# Patient Record
Sex: MALE | Race: OTHER | NOT HISPANIC OR LATINO
[De-identification: names, ages, dates, MRNs, and addresses within clinical notes are randomized per-mention and may not be internally consistent; named-entity substitution may affect disease eponyms.]

---

## 2021-05-21 DIAGNOSIS — Z01.818 ENCOUNTER FOR OTHER PREPROCEDURAL EXAMINATION: ICD-10-CM

## 2021-05-21 PROBLEM — Z00.00 ENCOUNTER FOR PREVENTIVE HEALTH EXAMINATION: Status: ACTIVE | Noted: 2021-05-21

## 2021-05-22 ENCOUNTER — APPOINTMENT (OUTPATIENT)
Dept: DISASTER EMERGENCY | Facility: CLINIC | Age: 51
End: 2021-05-22

## 2021-05-23 LAB — SARS-COV-2 N GENE NPH QL NAA+PROBE: NOT DETECTED

## 2021-05-24 RX ORDER — CHLORHEXIDINE GLUCONATE 213 G/1000ML
1 SOLUTION TOPICAL ONCE
Refills: 0 | Status: DISCONTINUED | OUTPATIENT
Start: 2021-05-25 | End: 2021-05-26

## 2021-05-24 NOTE — H&P PST ADULT - ASSESSMENT
58 y/o M with PMH CAD with c/o WOODARD. Recent NST + for inferolateral ischemia and he presents for [facility] for further evaluation of his coronary anatomy    NPO for procedure  Consent to be obtained by MD prior to procedure 56 y/o M with PMH CAD with c/o WOODARD. Recent NST + for inferolateral ischemia and he presents for [facility] for further evaluation of his coronary anatomy  Abn CARRIE/PVR also for peripheral angiogram    NPO for procedure  Consent to be obtained by MD prior to procedure  Procedure d/w patient, risks and benefits explained, questions answered

## 2021-05-24 NOTE — H&P PST ADULT - HISTORY OF PRESENT ILLNESS
49 y/o M with PMH HTN, HLD, DM, CAD S/P PCI to mid and prox LAD, mid and prox LCx, mid, proximal and distal RCA. He has had WOODARD and recent nuclear stress test which demonstrated inferoseptal ischemia. He presents for left heart catheterization with Dr Mckee for further evaluation of his coronary anatomy.      ASA  Mallampati  GFR  BRA  Covid negative 5/23/21    Symptoms:        Angina (Class):        Ischemic Symptoms: WOODARD    Heart Failure:        Systolic/Diastolic/Combined: NA       NYHA Class (within 2 weeks): NA    Assessment of LVEF (Must be within 6 months):       EF: 55%       Assessed by: NST       Date: 4/1/21    Prior Cardiac Interventions (LHC, stents, CABG):       PCI's (Date, Stents, Vessels): 4/2/20 JOE to mid and prox LAD, mid and prox LCx, mid, proximal and distal RCA       CABG (Date, Grafts): NA    Noninvasive Testing:   Stress Test: Date: 4/1/21       Protocol: Wallace       Duration of Exercise: 7 min       Symptoms: WOODARD       EKG Changes: 2.5 mm depression inferolateral leads       DTS: -5.5       Myocardial Imaging: small to moderate sized, mild severity, prox to mid inferoseptal, partially reversible defect suggestive of ischemia       Risk Assessment (Low, Medium, High): Medium    Echo (Date, Findings): 3/29/21 normal LV function EF 60-65%, tr MR    Antianginal Therapies:        Beta Blockers:         Calcium Channel Blockers:        Long Acting Nitrates:        Ranexa:     Associated Risk Factors:        Cerebrovascular Disease: N/A       Chronic Lung Disease: N/A       Peripheral Arterial Disease: N/A       Chronic Kidney Disease (if yes, what is GFR): N/A       Uncontrolled Diabetes (if yes, what is HgbA1C or FBS): N/A       Poorly Controlled Hypertension (if yes, what is SBP): N/A       Morbid Obesity (if yes, what is BMI): N/A       History of Recent Ventricular Arrhythmia: N/A       Inability to Ambulate Safely: N/A       Need for Therapeutic Anticoagulation: N/A       Antiplatelet or Contrast Allergy: N/A 51 y/o M, former smoker .5PPD x10 years quit 20 years ago) with PMH HTN, HLD, DM, CAD S/P PCI to mid and prox LAD, mid and prox LCx, mid, proximal and distal RCA. He has had WOODARD (CCSC III) and recent nuclear stress test which demonstrated inferoseptal ischemia.   He presents for left heart catheterization with Dr Mckee for further evaluation of his coronary anatomy.  OF note, he had an CARRIE/PVR in office and will be having a peripheral angiogram as well.     Assessment of LVEF (Must be within 6 months):       EF: 55%       Assessed by: NST       Date: 4/1/21    Prior Cardiac Interventions:  PCI's (Date, Stents, Vessels): 4/2/20 JOE to mid and prox LAD, mid and prox LCx, mid, proximal and distal RCA    Noninvasive Testing:   Stress Test: Date: 4/1/21       Protocol: Wallace       Duration of Exercise: 7 min       Symptoms: WOODARD       EKG Changes: 2.5 mm depression inferolateral leads       DTS: -5.5       Myocardial Imaging: small to moderate sized, mild severity, prox to mid inferoseptal, partially reversible defect suggestive of ischemia       Risk Assessment (Low, Medium, High): Medium    Echo (Date, Findings): 3/29/21 normal LV function EF 60-65%, tr MR

## 2021-05-24 NOTE — H&P PST ADULT - NSICDXPASTMEDICALHX_GEN_ALL_CORE_FT
PAST MEDICAL HISTORY:  CAD (coronary atherosclerotic disease)     Claudication     DM (diabetes mellitus)     HLD (hyperlipidemia)     HTN (hypertension)

## 2021-05-24 NOTE — H&P PST ADULT - REASON FOR ADMISSION
Left heart catheterization Left heart catheterization d/t inferoseptal ischemia on NST  Peripheral Angiography due to abn CARRIE/PVR

## 2021-05-24 NOTE — H&P PST ADULT - RS GEN PE MLT RESP DETAILS PC
normal/airway patent/breath sounds equal/good air movement/respirations non-labored/clear to auscultation bilaterally/no chest wall tenderness/no rales/no rhonchi

## 2021-05-24 NOTE — H&P PST ADULT - NSICDXFAMILYHX_GEN_ALL_CORE_FT
FAMILY HISTORY:  Father  Still living? Yes, Estimated age: Age Unknown  FH: CAD (coronary artery disease), Age at diagnosis: Age Unknown    Sibling  Still living? Yes, Estimated age: Age Unknown  FH: CAD (coronary artery disease), Age at diagnosis: Age Unknown

## 2021-05-25 ENCOUNTER — INPATIENT (INPATIENT)
Facility: HOSPITAL | Age: 51
LOS: 0 days | Discharge: HOME | DRG: 247 | End: 2021-05-26
Attending: INTERNAL MEDICINE | Admitting: INTERNAL MEDICINE
Payer: COMMERCIAL

## 2021-05-25 ENCOUNTER — TRANSCRIPTION ENCOUNTER (OUTPATIENT)
Age: 51
End: 2021-05-25

## 2021-05-25 VITALS
RESPIRATION RATE: 16 BRPM | TEMPERATURE: 98 F | DIASTOLIC BLOOD PRESSURE: 87 MMHG | HEIGHT: 67 IN | SYSTOLIC BLOOD PRESSURE: 174 MMHG | HEART RATE: 76 BPM | OXYGEN SATURATION: 99 % | WEIGHT: 205.03 LBS

## 2021-05-25 DIAGNOSIS — Z90.49 ACQUIRED ABSENCE OF OTHER SPECIFIED PARTS OF DIGESTIVE TRACT: Chronic | ICD-10-CM

## 2021-05-25 DIAGNOSIS — R94.39 ABNORMAL RESULT OF OTHER CARDIOVASCULAR FUNCTION STUDY: ICD-10-CM

## 2021-05-25 LAB
ANION GAP SERPL CALC-SCNC: 9 MMOL/L — SIGNIFICANT CHANGE UP (ref 5–17)
BASOPHILS # BLD AUTO: 0.02 K/UL — SIGNIFICANT CHANGE UP (ref 0–0.2)
BASOPHILS NFR BLD AUTO: 0.2 % — SIGNIFICANT CHANGE UP (ref 0–2)
BLD GP AB SCN SERPL QL: SIGNIFICANT CHANGE UP
BUN SERPL-MCNC: 14 MG/DL — SIGNIFICANT CHANGE UP (ref 8–20)
CALCIUM SERPL-MCNC: 9.1 MG/DL — SIGNIFICANT CHANGE UP (ref 8.6–10.2)
CHLORIDE SERPL-SCNC: 102 MMOL/L — SIGNIFICANT CHANGE UP (ref 98–107)
CO2 SERPL-SCNC: 26 MMOL/L — SIGNIFICANT CHANGE UP (ref 22–29)
CREAT SERPL-MCNC: 0.78 MG/DL — SIGNIFICANT CHANGE UP (ref 0.5–1.3)
EOSINOPHIL # BLD AUTO: 0.2 K/UL — SIGNIFICANT CHANGE UP (ref 0–0.5)
EOSINOPHIL NFR BLD AUTO: 2.3 % — SIGNIFICANT CHANGE UP (ref 0–6)
GLUCOSE BLDC GLUCOMTR-MCNC: 239 MG/DL — HIGH (ref 70–99)
GLUCOSE SERPL-MCNC: 191 MG/DL — HIGH (ref 70–99)
HCT VFR BLD CALC: 40.9 % — SIGNIFICANT CHANGE UP (ref 39–50)
HGB BLD-MCNC: 13.9 G/DL — SIGNIFICANT CHANGE UP (ref 13–17)
IMM GRANULOCYTES NFR BLD AUTO: 0.2 % — SIGNIFICANT CHANGE UP (ref 0–1.5)
LYMPHOCYTES # BLD AUTO: 2.75 K/UL — SIGNIFICANT CHANGE UP (ref 1–3.3)
LYMPHOCYTES # BLD AUTO: 32 % — SIGNIFICANT CHANGE UP (ref 13–44)
MCHC RBC-ENTMCNC: 28.8 PG — SIGNIFICANT CHANGE UP (ref 27–34)
MCHC RBC-ENTMCNC: 34 GM/DL — SIGNIFICANT CHANGE UP (ref 32–36)
MCV RBC AUTO: 84.9 FL — SIGNIFICANT CHANGE UP (ref 80–100)
MONOCYTES # BLD AUTO: 0.68 K/UL — SIGNIFICANT CHANGE UP (ref 0–0.9)
MONOCYTES NFR BLD AUTO: 7.9 % — SIGNIFICANT CHANGE UP (ref 2–14)
NEUTROPHILS # BLD AUTO: 4.93 K/UL — SIGNIFICANT CHANGE UP (ref 1.8–7.4)
NEUTROPHILS NFR BLD AUTO: 57.4 % — SIGNIFICANT CHANGE UP (ref 43–77)
PLATELET # BLD AUTO: 315 K/UL — SIGNIFICANT CHANGE UP (ref 150–400)
POTASSIUM SERPL-MCNC: 3.8 MMOL/L — SIGNIFICANT CHANGE UP (ref 3.5–5.3)
POTASSIUM SERPL-SCNC: 3.8 MMOL/L — SIGNIFICANT CHANGE UP (ref 3.5–5.3)
RBC # BLD: 4.82 M/UL — SIGNIFICANT CHANGE UP (ref 4.2–5.8)
RBC # FLD: 12 % — SIGNIFICANT CHANGE UP (ref 10.3–14.5)
SODIUM SERPL-SCNC: 137 MMOL/L — SIGNIFICANT CHANGE UP (ref 135–145)
WBC # BLD: 8.6 K/UL — SIGNIFICANT CHANGE UP (ref 3.8–10.5)
WBC # FLD AUTO: 8.6 K/UL — SIGNIFICANT CHANGE UP (ref 3.8–10.5)

## 2021-05-25 PROCEDURE — 93010 ELECTROCARDIOGRAM REPORT: CPT

## 2021-05-25 RX ORDER — INSULIN LISPRO 100/ML
VIAL (ML) SUBCUTANEOUS
Refills: 0 | Status: DISCONTINUED | OUTPATIENT
Start: 2021-05-25 | End: 2021-05-26

## 2021-05-25 RX ORDER — DEXTROSE 50 % IN WATER 50 %
25 SYRINGE (ML) INTRAVENOUS ONCE
Refills: 0 | Status: DISCONTINUED | OUTPATIENT
Start: 2021-05-25 | End: 2021-05-26

## 2021-05-25 RX ORDER — ACETAMINOPHEN 500 MG
650 TABLET ORAL EVERY 6 HOURS
Refills: 0 | Status: DISCONTINUED | OUTPATIENT
Start: 2021-05-25 | End: 2021-05-26

## 2021-05-25 RX ORDER — GLUCAGON INJECTION, SOLUTION 0.5 MG/.1ML
1 INJECTION, SOLUTION SUBCUTANEOUS ONCE
Refills: 0 | Status: DISCONTINUED | OUTPATIENT
Start: 2021-05-25 | End: 2021-05-26

## 2021-05-25 RX ORDER — AMLODIPINE BESYLATE 2.5 MG/1
10 TABLET ORAL DAILY
Refills: 0 | Status: DISCONTINUED | OUTPATIENT
Start: 2021-05-25 | End: 2021-05-26

## 2021-05-25 RX ORDER — DEXTROSE 50 % IN WATER 50 %
12.5 SYRINGE (ML) INTRAVENOUS ONCE
Refills: 0 | Status: DISCONTINUED | OUTPATIENT
Start: 2021-05-25 | End: 2021-05-26

## 2021-05-25 RX ORDER — SODIUM CHLORIDE 9 MG/ML
1000 INJECTION, SOLUTION INTRAVENOUS
Refills: 0 | Status: DISCONTINUED | OUTPATIENT
Start: 2021-05-25 | End: 2021-05-26

## 2021-05-25 RX ORDER — ASPIRIN/CALCIUM CARB/MAGNESIUM 324 MG
81 TABLET ORAL DAILY
Refills: 0 | Status: DISCONTINUED | OUTPATIENT
Start: 2021-05-25 | End: 2021-05-26

## 2021-05-25 RX ORDER — LOSARTAN POTASSIUM 100 MG/1
100 TABLET, FILM COATED ORAL DAILY
Refills: 0 | Status: DISCONTINUED | OUTPATIENT
Start: 2021-05-25 | End: 2021-05-26

## 2021-05-25 RX ORDER — ATORVASTATIN CALCIUM 80 MG/1
40 TABLET, FILM COATED ORAL AT BEDTIME
Refills: 0 | Status: DISCONTINUED | OUTPATIENT
Start: 2021-05-25 | End: 2021-05-26

## 2021-05-25 RX ORDER — PRASUGREL 5 MG/1
10 TABLET, FILM COATED ORAL DAILY
Refills: 0 | Status: DISCONTINUED | OUTPATIENT
Start: 2021-05-25 | End: 2021-05-26

## 2021-05-25 RX ORDER — DEXTROSE 50 % IN WATER 50 %
15 SYRINGE (ML) INTRAVENOUS ONCE
Refills: 0 | Status: DISCONTINUED | OUTPATIENT
Start: 2021-05-25 | End: 2021-05-26

## 2021-05-25 RX ADMIN — Medication 4: at 21:01

## 2021-05-25 RX ADMIN — ATORVASTATIN CALCIUM 40 MILLIGRAM(S): 80 TABLET, FILM COATED ORAL at 21:02

## 2021-05-25 RX ADMIN — Medication 650 MILLIGRAM(S): at 23:59

## 2021-05-25 NOTE — DISCHARGE NOTE PROVIDER - HOSPITAL COURSE
"""Informed patient that their cataract is visually significant and meets the criteria for cataract surgery to increase their vision and decrease their glare symptoms.  RBALs of surgery discussed 50 year old male with multi stents (mid and prox LAD, mid and prox CX, mid/prox/distal RCA with abnormal stress test (inferoseptal ischemia).  Now s/p PCI with JOE X1 pLAD 50 year old male with multi stents (mid and prox LAD, mid and prox CX, mid/prox/distal RCA with abnormal stress test (inferoseptal ischemia).  Now s/p PCI with JOE X1 pLAD  Recovered without incident.  Telemetry NSR no arrhthymias  renal functions ok    < from: Cardiac Cath Lab - Adult (05.25.21 @ 15:12) >    VENTRICLES: There were no left ventricular global or regional wall motion  abnormalities. Global left ventricular function was normal. EF estimated  was 60 %.  VALVES: MITRAL VALVE: The mitral valve exhibited trivial regurgitation  (less than 1+).  CORONARY VESSELS: The coronary circulation is right dominant.  LM:   --  Mid left main: There was a tubular 20 % stenosis.  LAD:   --  Proximal LAD: There was a tubular 90 % stenosis at the proximal  margin of the stented segment. MLA by IVUS is 2.8sq mm ,indicating a  significant stenosis. In a second lesion, there was a tubular 0 % stenosis  at the site of a prior stent.  --  Mid LAD: There was a diffuse 20 % stenosis at the site of a prior  stent.  CX:   --  Proximal circumflex: There was a tubular 0 % stenosis at the site  of a prior stent.  --  Mid circumflex: There was a diffuse 0 % stenosis at the site of a prior  stent.  RCA:   --  Proximal RCA: There was a diffuse 0 % stenosis at the site of a  prior stent.  --  Mid RCA: There was a tubular 20 % stenosis at the site of a prior  stent.  --  Distal RCA: There was a tubular 70 % stenosis at the site of a prior  stent.  --  RPDA: There was a tubular 85 % stenosis.  AORTA: Abdominal aorta: The segment was normal in size. There is minimal  atherosclerotic changes. Renal arteries are patent bilaterally without any  significant disease.  LEFT LOWER EXTREMITY VESSELS: Left lower extremity angiography reveals  moderate atherosclerosis. Left common iliac: Normal. Left external iliac:  Normal. Left common femoral: Normal. Mid left superficial femoral: There  was a diffuse 40 % stenosis. Left popliteal: Normal. Proximal left  anterior tibial: There was a tubular 80 % stenosis. Left tibio-peroneal:  Normal. Left peroneal: Angiographyshowed mild atherosclerosis.  RIGHT LOWER EXTREMITY VESSELS: Right lower extremity angiography reveals  severe atherosclerosis. Right common iliac: Normal. Proximal right common  iliac: There was a diffuse 20 % stenosis. Right external iliac: Normal.  Proximal right superficial femoral: There was a tubular 20 % stenosis. Mid  right superficial femoral: There was a tubular 90 % stenosis. Right  popliteal: Normal. Right anterior tibial: The vessel was small.  Angiography showed moderate atherosclerosis. Right posterior tibial:  Angiography showed minor luminal irregularities. Right peroneal:  Angiography showed mild atherosclerosis.  COMPLICATIONS: No complications occurred during the cath lab visit.  SUMMARY:  LEFT LOWER EXTREMITY VESSELS: Left lower extremity angiography reveals  moderate atherosclerosis.  RIGHT LOWER EXTREMITY VESSELS: Right lower extremity angiography reveals  severe atherosclerosis.  1ST LESION INTERVENTIONS: A successful drug-eluting stent was performed on  the 90 % lesion in the proximal LAD. Following intervention there was an  excellent angiographic appearance with a 0 % residual stenosis.  DIAGNOSTIC RECOMMENDATIONS: Patient to return for laser atherectomy of the  distal RCA and stent of the RPDA.    <

## 2021-05-25 NOTE — DISCHARGE NOTE PROVIDER - NSDCMRMEDTOKEN_GEN_ALL_CORE_FT
Alogliptin 25 mg oral tablet: 1 tab(s) orally once a day  amLODIPine 10 mg oral tablet: 1 tab(s) orally once a day  Aspir 81 oral delayed release tablet: 1 tab(s) orally once a day  atorvastatin 20 mg oral tablet: 1 tab(s) orally once a day  glipiZIDE 10 mg oral tablet: 1 tab(s) orally 2 times a day  losartan 100 mg oral tablet: 1 tab(s) orally once a day  metFORMIN 1000 mg oral tablet: 1 tab(s) orally 2 times a day  prasugrel 10 mg oral tablet: 1 tab(s) orally once a day   Alogliptin 25 mg oral tablet: 1 tab(s) orally once a day  amLODIPine 10 mg oral tablet: 1 tab(s) orally once a day  Aspir 81 oral delayed release tablet: 1 tab(s) orally once a day  atorvastatin 20 mg oral tablet: 1 tab(s) orally once a day  glipiZIDE 10 mg oral tablet: 1 tab(s) orally 2 times a day  losartan 100 mg oral tablet: 1 tab(s) orally once a day  metFORMIN 1000 mg oral tablet: 1 tab(s) orally 2 times a day  Hold until 5/28 then you can resume it  prasugrel 10 mg oral tablet: 1 tab(s) orally once a day

## 2021-05-25 NOTE — DISCHARGE NOTE PROVIDER - NSDCCPCAREPLAN_GEN_ALL_CORE_FT
PRINCIPAL DISCHARGE DIAGNOSIS  Diagnosis: CAD (coronary artery disease)  Assessment and Plan of Treatment: s/p PCI

## 2021-05-25 NOTE — DISCHARGE NOTE PROVIDER - CARE PROVIDER_API CALL
Cameron Mckee)  Cardiovascular Disease; Interventional Cardiology  [address] [place] [address]  Phone: [phone]  Fax: [phone]  Follow Up Time:

## 2021-05-25 NOTE — DISCHARGE NOTE PROVIDER - NSDCCPTREATMENT_GEN_ALL_CORE_FT
PRINCIPAL PROCEDURE  Procedure: Left heart cardiac cath  Findings and Treatment: s/p PCI to pLAD

## 2021-05-25 NOTE — ASU PATIENT PROFILE, ADULT - PMH
CAD (coronary atherosclerotic disease)    Claudication    DM (diabetes mellitus)    HLD (hyperlipidemia)    HTN (hypertension)

## 2021-05-25 NOTE — PROGRESS NOTE ADULT - SUBJECTIVE AND OBJECTIVE BOX
Nurse Practitioner Progress note:     INTERVAL HISTORY: 50 year old male with multi stents (mid and prox LAD, mid and prox CX, mid/prox/distal RCA with abnormal stress test (inferoseptal ischemia) for [facility] to assess coronary anatomy.    MEDICATIONS:  amLODIPine   Tablet 10 milliGRAM(s) Oral daily  losartan 100 milliGRAM(s) Oral daily  atorvastatin 40 milliGRAM(s) Oral at bedtime  aspirin enteric coated 81 milliGRAM(s) Oral daily  chlorhexidine 4% Liquid 1 Application(s) Topical Once  prasugrel 10 milliGRAM(s) Oral daily      TELEMETRY: NSR    T(C): 36.6 (05-25-21 @ 12:30), Max: 36.6 (05-25-21 @ 12:30)  HR: 65 (05-25-21 @ 16:45) (64 - 76)  BP: 119/65 (05-25-21 @ 16:45) (119/65 - 174/87)  RR: 16 (05-25-21 @ 16:45) (16 - 16)  SpO2: 96% (05-25-21 @ 16:45) (94% - 99%)  Wt(kg): --    PHYSICAL EXAM:  Appearance: Normal	  Cardiovascular: Normal S1 S2, No JVD, No murmurs, No edema  Respiratory: Lungs clear to auscultation	  Psychiatry: A & O x 3, Mood & affect appropriate  Gastrointestinal:  Soft, Non-tender, + BS	  Skin: No rashes, No ecchymoses, No cyanosis  Neurologic: Non-focal, A&O X3.  No neuro deficits  Extremities: Normal range of motion, No clubbing, cyanosis or edema  Vascular: Peripheral pulses palpable 2+ bilaterally  Procedure Site: Right groin with angioseal closure benign.  No bleeding/hematoma/ecchymosis.  + palp pedal pusle    12 lead EKG:  	NSR 65 bpm.  No acute changes        MEDICATIONS DURING PROCEDURE:  versed 1mg  fentanyl 100 mcg  heparin 07350 u  Effient 20 mg  omnipaque 193 ml    PROCEDURE RESULTS: S/P PCI with JOE X1 (RADHA 4.0 X 12MM) to pLAD.  Also abdominal aortogram with b/l runoffs done, report pending.     ASSESSMENT/PLAN: 	  -Groin precautions  -Bedrest X 2h  -Resume home meds  -Follow up with Dr. Mckee  -Check labs/EKG/site check in AM  -Probable discharge in AM  -cardiac rehab info provided/referral and communication to cardiac rehab completed

## 2021-05-25 NOTE — DISCHARGE NOTE PROVIDER - REASON FOR ADMISSION
Left heart catheterization d/t inferoseptal ischemia on NST  Peripheral Angiography due to abn CARRIE/PVR

## 2021-05-25 NOTE — PROGRESS NOTE ADULT - SUBJECTIVE AND OBJECTIVE BOX
Right femoral site examined and dressing removed along w/ sandbag. NO hematoma noted, R femoral and distal pulses 2+ and extremity warm and well perfused.   Patient may sit up and continue to monitor on Tele and access site overnight.  Stable for now

## 2021-05-26 ENCOUNTER — TRANSCRIPTION ENCOUNTER (OUTPATIENT)
Age: 51
End: 2021-05-26

## 2021-05-26 VITALS
RESPIRATION RATE: 18 BRPM | SYSTOLIC BLOOD PRESSURE: 133 MMHG | HEART RATE: 80 BPM | OXYGEN SATURATION: 98 % | DIASTOLIC BLOOD PRESSURE: 83 MMHG | TEMPERATURE: 99 F

## 2021-05-26 LAB
A1C WITH ESTIMATED AVERAGE GLUCOSE RESULT: 6.9 % — HIGH (ref 4–5.6)
ANION GAP SERPL CALC-SCNC: 10 MMOL/L — SIGNIFICANT CHANGE UP (ref 5–17)
BUN SERPL-MCNC: 12 MG/DL — SIGNIFICANT CHANGE UP (ref 8–20)
CALCIUM SERPL-MCNC: 8.7 MG/DL — SIGNIFICANT CHANGE UP (ref 8.6–10.2)
CHLORIDE SERPL-SCNC: 104 MMOL/L — SIGNIFICANT CHANGE UP (ref 98–107)
CO2 SERPL-SCNC: 24 MMOL/L — SIGNIFICANT CHANGE UP (ref 22–29)
COVID-19 SPIKE DOMAIN AB INTERP: POSITIVE
COVID-19 SPIKE DOMAIN ANTIBODY RESULT: >250 U/ML — HIGH
CREAT SERPL-MCNC: 0.75 MG/DL — SIGNIFICANT CHANGE UP (ref 0.5–1.3)
ESTIMATED AVERAGE GLUCOSE: 151 MG/DL — HIGH (ref 68–114)
GLUCOSE BLDC GLUCOMTR-MCNC: 161 MG/DL — HIGH (ref 70–99)
GLUCOSE SERPL-MCNC: 223 MG/DL — HIGH (ref 70–99)
HCT VFR BLD CALC: 37.9 % — LOW (ref 39–50)
HGB BLD-MCNC: 12.6 G/DL — LOW (ref 13–17)
MAGNESIUM SERPL-MCNC: 2 MG/DL — SIGNIFICANT CHANGE UP (ref 1.6–2.6)
MCHC RBC-ENTMCNC: 28.7 PG — SIGNIFICANT CHANGE UP (ref 27–34)
MCHC RBC-ENTMCNC: 33.2 GM/DL — SIGNIFICANT CHANGE UP (ref 32–36)
MCV RBC AUTO: 86.3 FL — SIGNIFICANT CHANGE UP (ref 80–100)
PLATELET # BLD AUTO: 286 K/UL — SIGNIFICANT CHANGE UP (ref 150–400)
POTASSIUM SERPL-MCNC: 3.6 MMOL/L — SIGNIFICANT CHANGE UP (ref 3.5–5.3)
POTASSIUM SERPL-SCNC: 3.6 MMOL/L — SIGNIFICANT CHANGE UP (ref 3.5–5.3)
RBC # BLD: 4.39 M/UL — SIGNIFICANT CHANGE UP (ref 4.2–5.8)
RBC # FLD: 12 % — SIGNIFICANT CHANGE UP (ref 10.3–14.5)
SARS-COV-2 IGG+IGM SERPL QL IA: >250 U/ML — HIGH
SARS-COV-2 IGG+IGM SERPL QL IA: POSITIVE
SODIUM SERPL-SCNC: 138 MMOL/L — SIGNIFICANT CHANGE UP (ref 135–145)
WBC # BLD: 9.27 K/UL — SIGNIFICANT CHANGE UP (ref 3.8–10.5)
WBC # FLD AUTO: 9.27 K/UL — SIGNIFICANT CHANGE UP (ref 3.8–10.5)

## 2021-05-26 PROCEDURE — C1753: CPT

## 2021-05-26 PROCEDURE — 86769 SARS-COV-2 COVID-19 ANTIBODY: CPT

## 2021-05-26 PROCEDURE — C9600: CPT | Mod: LD

## 2021-05-26 PROCEDURE — 99152 MOD SED SAME PHYS/QHP 5/>YRS: CPT

## 2021-05-26 PROCEDURE — C1887: CPT

## 2021-05-26 PROCEDURE — 86901 BLOOD TYPING SEROLOGIC RH(D): CPT

## 2021-05-26 PROCEDURE — G0278: CPT

## 2021-05-26 PROCEDURE — 36415 COLL VENOUS BLD VENIPUNCTURE: CPT

## 2021-05-26 PROCEDURE — 99153 MOD SED SAME PHYS/QHP EA: CPT

## 2021-05-26 PROCEDURE — C1894: CPT

## 2021-05-26 PROCEDURE — 80048 BASIC METABOLIC PNL TOTAL CA: CPT

## 2021-05-26 PROCEDURE — C1769: CPT

## 2021-05-26 PROCEDURE — 85025 COMPLETE CBC W/AUTO DIFF WBC: CPT

## 2021-05-26 PROCEDURE — C1874: CPT

## 2021-05-26 PROCEDURE — C1725: CPT

## 2021-05-26 PROCEDURE — 85027 COMPLETE CBC AUTOMATED: CPT

## 2021-05-26 PROCEDURE — 83036 HEMOGLOBIN GLYCOSYLATED A1C: CPT

## 2021-05-26 PROCEDURE — C1760: CPT

## 2021-05-26 PROCEDURE — 86900 BLOOD TYPING SEROLOGIC ABO: CPT

## 2021-05-26 PROCEDURE — 93010 ELECTROCARDIOGRAM REPORT: CPT

## 2021-05-26 PROCEDURE — 86850 RBC ANTIBODY SCREEN: CPT

## 2021-05-26 PROCEDURE — 82962 GLUCOSE BLOOD TEST: CPT

## 2021-05-26 PROCEDURE — 92978 ENDOLUMINL IVUS OCT C 1ST: CPT | Mod: LD

## 2021-05-26 PROCEDURE — 93005 ELECTROCARDIOGRAM TRACING: CPT

## 2021-05-26 PROCEDURE — 83735 ASSAY OF MAGNESIUM: CPT

## 2021-05-26 PROCEDURE — 93458 L HRT ARTERY/VENTRICLE ANGIO: CPT | Mod: 59

## 2021-05-26 RX ORDER — METFORMIN HYDROCHLORIDE 850 MG/1
1 TABLET ORAL
Qty: 0 | Refills: 0 | DISCHARGE

## 2021-05-26 RX ORDER — POTASSIUM CHLORIDE 20 MEQ
40 PACKET (EA) ORAL ONCE
Refills: 0 | Status: COMPLETED | OUTPATIENT
Start: 2021-05-26 | End: 2021-05-26

## 2021-05-26 RX ADMIN — Medication 40 MILLIEQUIVALENT(S): at 11:20

## 2021-05-26 RX ADMIN — PRASUGREL 10 MILLIGRAM(S): 5 TABLET, FILM COATED ORAL at 08:17

## 2021-05-26 RX ADMIN — Medication 2: at 08:17

## 2021-05-26 RX ADMIN — LOSARTAN POTASSIUM 100 MILLIGRAM(S): 100 TABLET, FILM COATED ORAL at 05:41

## 2021-05-26 RX ADMIN — Medication 81 MILLIGRAM(S): at 08:17

## 2021-05-26 RX ADMIN — AMLODIPINE BESYLATE 10 MILLIGRAM(S): 2.5 TABLET ORAL at 05:41

## 2021-05-26 RX ADMIN — Medication 650 MILLIGRAM(S): at 00:50

## 2021-05-26 NOTE — DISCHARGE NOTE NURSING/CASE MANAGEMENT/SOCIAL WORK - PATIENT PORTAL LINK FT
You can access the FollowMyHealth Patient Portal offered by [facility] by registering at the following website: [email or web address]. By joining [employer]’s FollowMyHealth portal, you will also be able to view your health information using other applications (apps) compatible with our system.

## 2021-05-26 NOTE — PROGRESS NOTE ADULT - SUBJECTIVE AND OBJECTIVE BOX
Patient is a 50y old  Male who presents with a chief complaint of Left heart catheterization d/t inferoseptal ischemia on NST Peripheral Angiography due to abn CARRIE/PVR         acetaminophen   Tablet .. 650 milliGRAM(s) Oral every 6 hours PRN  amLODIPine   Tablet 10 milliGRAM(s) Oral daily  aspirin enteric coated 81 milliGRAM(s) Oral daily  atorvastatin 40 milliGRAM(s) Oral at bedtime  glucagon  Injectable 1 milliGRAM(s) IntraMuscular once  insulin lispro (ADMELOG) corrective regimen sliding scale   SubCutaneous Before meals and at bedtime  losartan 100 milliGRAM(s) Oral daily  prasugrel 10 milliGRAM(s) Oral daily    05-26    138  |  104  |  12.0  ----------------------------<  223<H>  3.6   |  24.0  |  0.75    Ca    8.7      26 May 2021 05:36  Mg     2.0     05-26                            12.6   9.27  )-----------( 286      ( 26 May 2021 05:36 )             37.9       T(C): 36.8 (05-26-21 @ 05:40), Max: 36.8 (05-26-21 @ 05:40)  HR: 77 (05-26-21 @ 05:40) (62 - 78)  BP: 124/80 (05-26-21 @ 05:40) (113/68 - 174/87)  RR: 16 (05-26-21 @ 05:40) (16 - 17)  SpO2: 96% (05-26-21 @ 05:40) (94% - 100%)      PE  Chest  Clear lung fields  Heart s1&s2 regular  Abd  soft active bowel sounds  EXT  No c/c/e  CATH SITE  Neuro  Alert oriented Non focal exam    A/P    ASHD s/p stent  Advised importance of taking antiplatelet agent on a regular basis  Low saturated fat diet discussed  Advised to follow up with Cardiologist within 2 weeks  Groin instructions given      50y old  Male who presents with a chief complaint of Left heart catheterization d/t inferoseptal ischemia on NST Peripheral Angiography due to abn CARRIE/PVR   Feeling good no chest pain anxious to go home  VSS  Telemetry NSR no arrhythmias  Nsr no ischemic changes      acetaminophen   Tablet .. 650 milliGRAM(s) Oral every 6 hours PRN  amLODIPine   Tablet 10 milliGRAM(s) Oral daily  aspirin enteric coated 81 milliGRAM(s) Oral daily  atorvastatin 40 milliGRAM(s) Oral at bedtime  glucagon  Injectable 1 milliGRAM(s) IntraMuscular once  insulin lispro (ADMELOG) corrective regimen sliding scale   SubCutaneous Before meals and at bedtime  losartan 100 milliGRAM(s) Oral daily  prasugrel 10 milliGRAM(s) Oral daily    05-26    138  |  104  |  12.0  ----------------------------<  223<H>  3.6   |  24.0  |  0.75    Ca    8.7      26 May 2021 05:36  Mg     2.0     05-26                            12.6   9.27  )-----------( 286      ( 26 May 2021 05:36 )             37.9     T(C): 36.8 (05-26-21 @ 05:40), Max: 36.8 (05-26-21 @ 05:40)  HR: 77 (05-26-21 @ 05:40) (62 - 78)  BP: 124/80 (05-26-21 @ 05:40) (113/68 - 174/87)  RR: 16 (05-26-21 @ 05:40) (16 - 17)  SpO2: 96% (05-26-21 @ 05:40) (94% - 100%)    PE  Chest  Clear lung fields  Heart s1&s2 regular  Abd  soft active bowel sounds  EXT  No c/c/e  right groin soft no hematoma foot warm  CATH SITE  Neuro  Alert oriented Non focal exam    A/P  ASHD s/p stent  Groin instructions given  Advised importance of taking antiplatelet agent on a regular basis  Low saturated fat diet discussed  Advised to follow up with Cardiologist within 2 weeks  Patient is not interested in cardiac rehab

## 2021-05-28 LAB — GLUCOSE BLDC GLUCOMTR-MCNC: 162 MG/DL — HIGH (ref 70–99)

## 2021-07-12 NOTE — H&P PST ADULT - NSICDXPASTSURGICALHX_GEN_ALL_CORE_FT
PAST SURGICAL HISTORY:  Presence of drug coated stent in left circumflex coronary artery 3.5 12 [place] JOE pLCX, 2.5 X 38 [place] JOE mLCX    S/P appendectomy     Status post insertion of drug-eluting stent into left anterior descending (LAD) artery for coronary RADHA 4.00 X 12MM drug-eluting stent pLAD    Status post insertion of drug-eluting stent into left anterior descending (LAD) artery for coronary 3.0 X 15 [place] JOE pLAD, 2.5 X 38 [place] JOE mLAD    Status post insertion of drug-eluting stent into right coronary artery for coronary artery disease 4.0 X 23 [place] JOE pRCA, 3.5 X 33 [place] JOE mRCA, 3.0 X 38 [place] JOE dRCA

## 2021-07-12 NOTE — H&P PST ADULT - NSICDXFAMILYHX_GEN_ALL_CORE_FT
FAMILY HISTORY:  Father  Still living? Yes, Estimated age: Age Unknown  Family history of myocardial infarction, Age at diagnosis: Age Unknown  FH: CAD (coronary artery disease), Age at diagnosis: Age Unknown    Mother  Still living? Unknown  Family history of diabetes mellitus, Age at diagnosis: Age Unknown    Sibling  Still living? Yes, Estimated age: Age Unknown  Family history of coronary artery bypass surgery, Age at diagnosis: Age Unknown  Family history of myocardial infarction, Age at diagnosis: Age Unknown  FH: CAD (coronary artery disease), Age at diagnosis: Age Unknown

## 2021-07-12 NOTE — H&P PST ADULT - NSICDXPASTMEDICALHX_GEN_ALL_CORE_FT
PAST MEDICAL HISTORY:  Coronary artery disease of native artery of native heart with stable angina pectoris     Essential hypertension     PAD (peripheral artery disease)     Pure hypercholesterolemia     Type 2 diabetes mellitus with other circulatory complication, without long-term current use of insul

## 2021-07-12 NOTE — H&P PST ADULT - NSANTHOSAYNRD_GEN_A_CORE
No. LILIA screening performed.  STOP BANG Legend: 0-2 = LOW Risk; 3-4 = INTERMEDIATE Risk; 5-8 = HIGH Risk

## 2021-07-12 NOTE — H&P PST ADULT - HISTORY OF PRESENT ILLNESS
49y/o male former smoker with history of CAD with prior stents, DM, HTN, HLD, PAD who had a nuclear stress test in April which showed a small to moderate sized, mild severity, proximal to mid inferoseptal, partially reversible defect suggestive of ischemia. He had a LHC and PCI of the pLAD. There was significant residual ISR of the dRCA.    Symptoms:        Angina (Class):        Ischemic Symptoms:     Heart Failure: N/A    Assessment of LVEF:       EF: 60-65%       Assessed by: Echo       Date: 3/29/2021    Prior Cardiac Interventions:       PCI's: 5/25/2021  VENTRICLES: There were no left ventricular global or regional wall motion abnormalities. Global left ventricular function was normal. EF estimated was 60 %.  VALVES: MITRAL VALVE: The mitral valve exhibited trivial regurgitation (less than 1+).  CORONARY VESSELS: The coronary circulation is right dominant.  LM:     --  Mid left main: There was a tubular 20 % stenosis.  LAD:     --  Proximal LAD: There was a tubular 90 % stenosis at the proximal margin of the stented segment. MLA by IVUS is 2.8sq mm ,indicating a significant stenosis. It was treated with an RADHA 4.00 X 12MM drug-eluting stent. In a second lesion, there was a tubular 0 % stenosis at the site of a prior stent (3.0 X 15 [place] JOE).  --  Mid LAD: There was a diffuse 20 % stenosis at the site of a prior stent (2.5 X 38 Radha JOE).  CX:     --  Proximal circumflex: There was a tubular 0 % stenosis at the site of a prior stent (3.5 12 [place] JOE).  --  Mid circumflex: There was a diffuse 0 % stenosis at the site of a prior stent (2.5 X 38 Radha JOE).  RCA:     --  Proximal RCA: There was a diffuse 0 % stenosis at the site of a prior stent (4.0 X 23 [place] JOE).  --  Mid RCA: There was a tubular 20 % stenosis at the site of a prior stent (3.5 X 33 [place] JOE).  --  Distal RCA: There was a tubular 70 % stenosis at the site of a prior stent (3.0 X 38 [place] JOE).  --  RPDA: There was a tubular 85 % stenosis.  AORTA: Abdominal aorta: The segment was normal in size. There is minimal atherosclerotic changes. Renal arteries are patent bilaterally without any significant disease.  LEFT LOWER EXTREMITY VESSELS: Left lower extremity angiography reveals moderate atherosclerosis.   ·	Left common iliac: Normal.   ·	Left external iliac: Normal.   ·	Left common femoral: Normal.   ·	Mid left superficial femoral: There was a diffuse 40 % stenosis.   ·	Left popliteal: Normal.   ·	Proximal left anterior tibial: There was a tubular 80 % stenosis.   ·	Left tibio-peroneal: Normal.   ·	Left peroneal: Angiography showed mild atherosclerosis.  RIGHT LOWER EXTREMITY VESSELS: Right lower extremity angiography reveals severe atherosclerosis.   ·	Right common iliac: Normal.   ·	Proximal right common iliac: There was a diffuse 20 % stenosis.   ·	Right external iliac: Normal.  ·	Proximal right superficial femoral: There was a tubular 20 % stenosis.   ·	Mid right superficial femoral: There was a tubular 90 % stenosis.   ·	Right popliteal: Normal.   ·	Right anterior tibial: The vessel was small. Angiography showed moderate atherosclerosis.   ·	Right posterior tibial: Angiography showed minor luminal irregularities.   ·	Right peroneal: Angiography showed mild atherosclerosis.         CABG: N/A    Noninvasive Testing:   Stress Test: 4/1/2021       Protocol: Wallace       Duration of Exercise: 7:00       Symptoms: dyspnea, fatigue, claudication       EKG Changes: 2.5 mm downsloping ST depressions in the inferolateral leads.       DTS: -5.5       Myocardial Imaging: Small to moderate sized, mild severity, proximal to mid inferoseptal, partially reversible defect suggestive of ischemia    Echo: 3/29/2021       LV: Normal with EF of 60-65%, grade 1 LVDD.       RV: Normal       LA: Normal       RA: Normal       Mitral Valve: Normal with trace MR.       Aortic Valve: Normal        Tricuspid Valve: Normal trace TR       Pulmonic Valve: Normal       Pericardium: No pericardial effusion    Antianginal Therapies:        Beta Blockers: N/A       Calcium Channel Blockers: Amlodipine 10 mg daily       Long Acting Nitrates: N/A       Ranexa: N/A    Associated Risk Factors:        Cerebrovascular Disease: N/A       Chronic Lung Disease: N/A       Peripheral Arterial Disease: N/A       Chronic Kidney Disease (if yes, what is GFR): N/A       Uncontrolled Diabetes (if yes, what is HgbA1C or FBS): N/A       Poorly Controlled Hypertension (if yes, what is SBP): N/A       Morbid Obesity (if yes, what is BMI): N/A       History of Recent Ventricular Arrhythmia: N/A       Inability to Ambulate Safely: N/A       Need for Therapeutic Anticoagulation: N/A       Antiplatelet or Contrast Allergy: N/A 51y/o male former smoker with history of CAD with prior stents, DM, HTN, HLD, PAD who had a nuclear stress test in April which showed a small to moderate sized, mild severity, proximal to mid inferoseptal, partially reversible defect suggestive of ischemia. He had a LHC and PCI of the pLAD. There was significant residual ISR of the dRCA.  He presents today for staged PCI/laser artherectomy of the dRCA with Dr. Mckee.    Symptoms:        Angina (Class): 3       Ischemic Symptoms: WOODARD    Heart Failure: N/A    Assessment of LVEF:       EF: 60-65%       Assessed by: Echo       Date: 3/29/2021    Prior Cardiac Interventions:       PCI's: 5/25/2021  VENTRICLES: There were no left ventricular global or regional wall motion abnormalities. Global left ventricular function was normal. EF estimated was 60 %.  VALVES: MITRAL VALVE: The mitral valve exhibited trivial regurgitation (less than 1+).  CORONARY VESSELS: The coronary circulation is right dominant.  LM:     --  Mid left main: There was a tubular 20 % stenosis.  LAD:     --  Proximal LAD: There was a tubular 90 % stenosis at the proximal margin of the stented segment. MLA by IVUS is 2.8sq mm ,indicating a significant stenosis. It was treated with an RADHA 4.00 X 12MM drug-eluting stent. In a second lesion, there was a tubular 0 % stenosis at the site of a prior stent (3.0 X 15 Radha JOE).  --  Mid LAD: There was a diffuse 20 % stenosis at the site of a prior stent (2.5 X 38 Radha JOE).  CX:     --  Proximal circumflex: There was a tubular 0 % stenosis at the site of a prior stent (3.5 12 [place] JOE).  --  Mid circumflex: There was a diffuse 0 % stenosis at the site of a prior stent (2.5 X 38 Radha JOE).  RCA:     --  Proximal RCA: There was a diffuse 0 % stenosis at the site of a prior stent (4.0 X 23 [place] JOE).  --  Mid RCA: There was a tubular 20 % stenosis at the site of a prior stent (3.5 X 33 Xience JOE).  --  Distal RCA: There was a tubular 70 % stenosis at the site of a prior stent (3.0 X 38 [place] JOE).  --  RPDA: There was a tubular 85 % stenosis.  AORTA: Abdominal aorta: The segment was normal in size. There is minimal atherosclerotic changes. Renal arteries are patent bilaterally without any significant disease.  LEFT LOWER EXTREMITY VESSELS: Left lower extremity angiography reveals moderate atherosclerosis.   ·	Left common iliac: Normal.   ·	Left external iliac: Normal.   ·	Left common femoral: Normal.   ·	Mid left superficial femoral: There was a diffuse 40 % stenosis.   ·	Left popliteal: Normal.   ·	Proximal left anterior tibial: There was a tubular 80 % stenosis.   ·	Left tibio-peroneal: Normal.   ·	Left peroneal: Angiography showed mild atherosclerosis.  RIGHT LOWER EXTREMITY VESSELS: Right lower extremity angiography reveals severe atherosclerosis.   ·	Right common iliac: Normal.   ·	Proximal right common iliac: There was a diffuse 20 % stenosis.   ·	Right external iliac: Normal.  ·	Proximal right superficial femoral: There was a tubular 20 % stenosis.   ·	Mid right superficial femoral: There was a tubular 90 % stenosis.   ·	Right popliteal: Normal.   ·	Right anterior tibial: The vessel was small. Angiography showed moderate atherosclerosis.   ·	Right posterior tibial: Angiography showed minor luminal irregularities.   ·	Right peroneal: Angiography showed mild atherosclerosis.         CABG: N/A    Noninvasive Testing:   Stress Test: 4/1/2021       Protocol: Wallace       Duration of Exercise: 7:00       Symptoms: dyspnea, fatigue, claudication       EKG Changes: 2.5 mm downsloping ST depressions in the inferolateral leads.       DTS: -5.5       Myocardial Imaging: Small to moderate sized, mild severity, proximal to mid inferoseptal, partially reversible defect suggestive of ischemia    Echo: 3/29/2021       LV: Normal with EF of 60-65%, grade 1 LVDD.       RV: Normal       LA: Normal       RA: Normal       Mitral Valve: Normal with trace MR.       Aortic Valve: Normal        Tricuspid Valve: Normal trace TR       Pulmonic Valve: Normal       Pericardium: No pericardial effusion    Antianginal Therapies:        Beta Blockers: N/A       Calcium Channel Blockers: Amlodipine 10 mg daily       Long Acting Nitrates: N/A       Ranexa: N/A    Associated Risk Factors:        Cerebrovascular Disease: N/A       Chronic Lung Disease: N/A       Peripheral Arterial Disease: N/A       Chronic Kidney Disease (if yes, what is GFR): N/A       Uncontrolled Diabetes (if yes, what is HgbA1C or FBS): N/A       Poorly Controlled Hypertension (if yes, what is SBP): N/A       Morbid Obesity (if yes, what is BMI): N/A       History of Recent Ventricular Arrhythmia: N/A       Inability to Ambulate Safely: N/A       Need for Therapeutic Anticoagulation: N/A       Antiplatelet or Contrast Allergy: N/A

## 2021-07-12 NOTE — H&P PST ADULT - ASSESSMENT
49y/o male former smoker with history of CAD with prior stents, DM, HTN, HLD, PAD who had a nuclear stress test in April which showed a small to moderate sized, mild severity, proximal to mid inferoseptal, partially reversible defect suggestive of ischemia. He had a LHC and PCI of the pLAD. There was significant residual ISR of the dRCA.  He presents today for staged PCI/laser artherectomy of the dRCA with Dr. Mckee.    -Consent to be obtained by MD  -ASA and Effient taken today  -NPO for procedure

## 2021-07-13 ENCOUNTER — TRANSCRIPTION ENCOUNTER (OUTPATIENT)
Age: 51
End: 2021-07-13

## 2021-07-13 ENCOUNTER — INPATIENT (INPATIENT)
Facility: HOSPITAL | Age: 51
LOS: 0 days | Discharge: HOME | DRG: 247 | End: 2021-07-14
Attending: INTERNAL MEDICINE | Admitting: INTERNAL MEDICINE
Payer: COMMERCIAL

## 2021-07-13 VITALS
OXYGEN SATURATION: 100 % | RESPIRATION RATE: 16 BRPM | DIASTOLIC BLOOD PRESSURE: 82 MMHG | HEART RATE: 82 BPM | WEIGHT: 195.99 LBS | TEMPERATURE: 97 F | SYSTOLIC BLOOD PRESSURE: 173 MMHG | HEIGHT: 67 IN

## 2021-07-13 DIAGNOSIS — Z95.5 PRESENCE OF CORONARY ANGIOPLASTY IMPLANT AND GRAFT: Chronic | ICD-10-CM

## 2021-07-13 DIAGNOSIS — Z90.49 ACQUIRED ABSENCE OF OTHER SPECIFIED PARTS OF DIGESTIVE TRACT: Chronic | ICD-10-CM

## 2021-07-13 DIAGNOSIS — R94.39 ABNORMAL RESULT OF OTHER CARDIOVASCULAR FUNCTION STUDY: ICD-10-CM

## 2021-07-13 LAB
ALBUMIN SERPL ELPH-MCNC: 4.6 G/DL — SIGNIFICANT CHANGE UP (ref 3.3–5.2)
ALP SERPL-CCNC: 85 U/L — SIGNIFICANT CHANGE UP (ref 40–120)
ALT FLD-CCNC: 28 U/L — SIGNIFICANT CHANGE UP
ANION GAP SERPL CALC-SCNC: 14 MMOL/L — SIGNIFICANT CHANGE UP (ref 5–17)
AST SERPL-CCNC: 23 U/L — SIGNIFICANT CHANGE UP
BASOPHILS # BLD AUTO: 0.03 K/UL — SIGNIFICANT CHANGE UP (ref 0–0.2)
BASOPHILS NFR BLD AUTO: 0.3 % — SIGNIFICANT CHANGE UP (ref 0–2)
BILIRUB SERPL-MCNC: 0.5 MG/DL — SIGNIFICANT CHANGE UP (ref 0.4–2)
BLD GP AB SCN SERPL QL: SIGNIFICANT CHANGE UP
BUN SERPL-MCNC: 10.9 MG/DL — SIGNIFICANT CHANGE UP (ref 8–20)
CALCIUM SERPL-MCNC: 9.9 MG/DL — SIGNIFICANT CHANGE UP (ref 8.6–10.2)
CHLORIDE SERPL-SCNC: 99 MMOL/L — SIGNIFICANT CHANGE UP (ref 98–107)
CO2 SERPL-SCNC: 25 MMOL/L — SIGNIFICANT CHANGE UP (ref 22–29)
CREAT SERPL-MCNC: 0.79 MG/DL — SIGNIFICANT CHANGE UP (ref 0.5–1.3)
EOSINOPHIL # BLD AUTO: 0.28 K/UL — SIGNIFICANT CHANGE UP (ref 0–0.5)
EOSINOPHIL NFR BLD AUTO: 3.2 % — SIGNIFICANT CHANGE UP (ref 0–6)
GLUCOSE BLDC GLUCOMTR-MCNC: 127 MG/DL — HIGH (ref 70–99)
GLUCOSE BLDC GLUCOMTR-MCNC: 269 MG/DL — HIGH (ref 70–99)
GLUCOSE SERPL-MCNC: 201 MG/DL — HIGH (ref 70–99)
HCT VFR BLD CALC: 44.9 % — SIGNIFICANT CHANGE UP (ref 39–50)
HGB BLD-MCNC: 14.9 G/DL — SIGNIFICANT CHANGE UP (ref 13–17)
IMM GRANULOCYTES NFR BLD AUTO: 0.2 % — SIGNIFICANT CHANGE UP (ref 0–1.5)
LYMPHOCYTES # BLD AUTO: 2.69 K/UL — SIGNIFICANT CHANGE UP (ref 1–3.3)
LYMPHOCYTES # BLD AUTO: 30.3 % — SIGNIFICANT CHANGE UP (ref 13–44)
MAGNESIUM SERPL-MCNC: 2.2 MG/DL — SIGNIFICANT CHANGE UP (ref 1.6–2.6)
MCHC RBC-ENTMCNC: 28.4 PG — SIGNIFICANT CHANGE UP (ref 27–34)
MCHC RBC-ENTMCNC: 33.2 GM/DL — SIGNIFICANT CHANGE UP (ref 32–36)
MCV RBC AUTO: 85.7 FL — SIGNIFICANT CHANGE UP (ref 80–100)
MONOCYTES # BLD AUTO: 0.59 K/UL — SIGNIFICANT CHANGE UP (ref 0–0.9)
MONOCYTES NFR BLD AUTO: 6.6 % — SIGNIFICANT CHANGE UP (ref 2–14)
NEUTROPHILS # BLD AUTO: 5.27 K/UL — SIGNIFICANT CHANGE UP (ref 1.8–7.4)
NEUTROPHILS NFR BLD AUTO: 59.4 % — SIGNIFICANT CHANGE UP (ref 43–77)
PLATELET # BLD AUTO: 346 K/UL — SIGNIFICANT CHANGE UP (ref 150–400)
POTASSIUM SERPL-MCNC: 3.6 MMOL/L — SIGNIFICANT CHANGE UP (ref 3.5–5.3)
POTASSIUM SERPL-SCNC: 3.6 MMOL/L — SIGNIFICANT CHANGE UP (ref 3.5–5.3)
PROT SERPL-MCNC: 9.1 G/DL — HIGH (ref 6.6–8.7)
RBC # BLD: 5.24 M/UL — SIGNIFICANT CHANGE UP (ref 4.2–5.8)
RBC # FLD: 11.9 % — SIGNIFICANT CHANGE UP (ref 10.3–14.5)
SODIUM SERPL-SCNC: 138 MMOL/L — SIGNIFICANT CHANGE UP (ref 135–145)
WBC # BLD: 8.88 K/UL — SIGNIFICANT CHANGE UP (ref 3.8–10.5)
WBC # FLD AUTO: 8.88 K/UL — SIGNIFICANT CHANGE UP (ref 3.8–10.5)

## 2021-07-13 PROCEDURE — 93010 ELECTROCARDIOGRAM REPORT: CPT

## 2021-07-13 RX ORDER — AMLODIPINE BESYLATE 2.5 MG/1
10 TABLET ORAL DAILY
Refills: 0 | Status: DISCONTINUED | OUTPATIENT
Start: 2021-07-13 | End: 2021-07-14

## 2021-07-13 RX ORDER — DEXTROSE 50 % IN WATER 50 %
15 SYRINGE (ML) INTRAVENOUS ONCE
Refills: 0 | Status: DISCONTINUED | OUTPATIENT
Start: 2021-07-13 | End: 2021-07-14

## 2021-07-13 RX ORDER — SODIUM CHLORIDE 9 MG/ML
1000 INJECTION, SOLUTION INTRAVENOUS
Refills: 0 | Status: DISCONTINUED | OUTPATIENT
Start: 2021-07-13 | End: 2021-07-14

## 2021-07-13 RX ORDER — DEXTROSE 50 % IN WATER 50 %
25 SYRINGE (ML) INTRAVENOUS ONCE
Refills: 0 | Status: DISCONTINUED | OUTPATIENT
Start: 2021-07-13 | End: 2021-07-14

## 2021-07-13 RX ORDER — INSULIN LISPRO 100/ML
VIAL (ML) SUBCUTANEOUS AT BEDTIME
Refills: 0 | Status: DISCONTINUED | OUTPATIENT
Start: 2021-07-13 | End: 2021-07-14

## 2021-07-13 RX ORDER — GLUCAGON INJECTION, SOLUTION 0.5 MG/.1ML
1 INJECTION, SOLUTION SUBCUTANEOUS ONCE
Refills: 0 | Status: DISCONTINUED | OUTPATIENT
Start: 2021-07-13 | End: 2021-07-14

## 2021-07-13 RX ORDER — ZOLPIDEM TARTRATE 10 MG/1
5 TABLET ORAL AT BEDTIME
Refills: 0 | Status: DISCONTINUED | OUTPATIENT
Start: 2021-07-13 | End: 2021-07-14

## 2021-07-13 RX ORDER — DEXTROSE 50 % IN WATER 50 %
12.5 SYRINGE (ML) INTRAVENOUS ONCE
Refills: 0 | Status: DISCONTINUED | OUTPATIENT
Start: 2021-07-13 | End: 2021-07-14

## 2021-07-13 RX ORDER — ALOGLIPTIN 12.5 MG/1
1 TABLET, FILM COATED ORAL
Qty: 0 | Refills: 0 | DISCHARGE

## 2021-07-13 RX ORDER — ATORVASTATIN CALCIUM 80 MG/1
20 TABLET, FILM COATED ORAL AT BEDTIME
Refills: 0 | Status: DISCONTINUED | OUTPATIENT
Start: 2021-07-13 | End: 2021-07-14

## 2021-07-13 RX ORDER — PRASUGREL 5 MG/1
10 TABLET, FILM COATED ORAL DAILY
Refills: 0 | Status: DISCONTINUED | OUTPATIENT
Start: 2021-07-14 | End: 2021-07-14

## 2021-07-13 RX ORDER — ACETAMINOPHEN 500 MG
650 TABLET ORAL EVERY 6 HOURS
Refills: 0 | Status: DISCONTINUED | OUTPATIENT
Start: 2021-07-13 | End: 2021-07-14

## 2021-07-13 RX ORDER — INSULIN LISPRO 100/ML
VIAL (ML) SUBCUTANEOUS
Refills: 0 | Status: DISCONTINUED | OUTPATIENT
Start: 2021-07-13 | End: 2021-07-14

## 2021-07-13 RX ORDER — ASPIRIN/CALCIUM CARB/MAGNESIUM 324 MG
81 TABLET ORAL DAILY
Refills: 0 | Status: DISCONTINUED | OUTPATIENT
Start: 2021-07-13 | End: 2021-07-14

## 2021-07-13 RX ORDER — LOSARTAN POTASSIUM 100 MG/1
100 TABLET, FILM COATED ORAL DAILY
Refills: 0 | Status: DISCONTINUED | OUTPATIENT
Start: 2021-07-13 | End: 2021-07-14

## 2021-07-13 RX ORDER — CHLORHEXIDINE GLUCONATE 213 G/1000ML
1 SOLUTION TOPICAL ONCE
Refills: 0 | Status: DISCONTINUED | OUTPATIENT
Start: 2021-07-13 | End: 2021-07-14

## 2021-07-13 RX ADMIN — ATORVASTATIN CALCIUM 20 MILLIGRAM(S): 80 TABLET, FILM COATED ORAL at 21:15

## 2021-07-13 RX ADMIN — Medication 6: at 16:31

## 2021-07-13 NOTE — DISCHARGE NOTE PROVIDER - NSDCMRMEDTOKEN_GEN_ALL_CORE_FT
amLODIPine 10 mg oral tablet: 1 tab(s) orally once a day  Aspir 81 oral delayed release tablet: 1 tab(s) orally once a day  atorvastatin 20 mg oral tablet: 1 tab(s) orally once a day  glipiZIDE 10 mg oral tablet: 1 tab(s) orally 2 times a day  losartan 100 mg oral tablet: 1 tab(s) orally once a day  metFORMIN 1000 mg oral tablet: 1 tab(s) orally 2 times a day  Hold until 5/28 then you can resume it  prasugrel 10 mg oral tablet: 1 tab(s) orally once a day   amLODIPine 10 mg oral tablet: 1 tab(s) orally once a day  Aspir 81 oral delayed release tablet: 1 tab(s) orally once a day  atorvastatin 20 mg oral tablet: 1 tab(s) orally once a day  glipiZIDE 10 mg oral tablet: 1 tab(s) orally 2 times a day  losartan 100 mg oral tablet: 1 tab(s) orally once a day  metFORMIN 1000 mg oral tablet: 1 tab(s) orally 2 times a day  Hold until 7/16 then you can resume it  prasugrel 10 mg oral tablet: 1 tab(s) orally once a day  zolpidem 5 mg oral tablet: 1 tab(s) orally once a day (at bedtime), As needed, Insomnia

## 2021-07-13 NOTE — DISCHARGE NOTE PROVIDER - NSDCFUADDAPPT_GEN_ALL_CORE_FT
Follow up with Dr. Mckee in one to two weeks    Restricted use with no heavy lifting of affected arm for 48 hours.  No submerging the arm in water for 48 hours.  You may start showering today.  Call your doctor for any bleeding, swelling, loss of sensation in the hand or fingers, or fingers turning blue.  If heavy bleeding or large lumps form, hold pressure at the spot and come to the Emergency Room.

## 2021-07-13 NOTE — PROGRESS NOTE ADULT - ASSESSMENT
A/P: 49 y/o male former smoker with history of CAD with prior stents, DM, HTN, HLD, PAD who had a nuclear stress test in April which showed a small to moderate sized, mild severity, proximal to mid inferoseptal, partially reversible defect suggestive of ischemia. He had a LHC and PCI of the pLAD. There was significant residual ISR of the dRCA.  Now he is s/p staged LHC/laser atherectomy/PCI via RRA by Dr. Mckee: Laser atherectomy x 5 passes and JOE x 2 to dRCA ([place] 2.5x26mm and 4.0x22mm) (prelim report; official report to follow)  -Admit to tele overnight secondary to meeting admission criteria s/p laser atherectomy  -Hopeful discharge to home in the am  -Bedrest until radial band removed then OOB  -Meds: Maintain Baby ASA/Effient/statin/Ca channel blocker   -Hold Metformin x 48 hours then restart  -cardiac rehab info provided/referral and communication to cardiac rehab completed  -Lifestyle mods/diet/activity/meds discussed with patient verbal understanding  -Benefits of ASA/Effient emphasized with patient verbal understanding  -Discussed with Dr. Mckee   A/P: 49 y/o male former smoker with history of CAD with prior stents, DM, HTN, HLD, PAD who had a nuclear stress test in April which showed a small to moderate sized, mild severity, proximal to mid inferoseptal, partially reversible defect suggestive of ischemia. He had a LHC and PCI of the pLAD. There was significant residual ISR of the dRCA.  Now he is s/p staged LHC/laser atherectomy/PCI via RRA by Dr. Mckee: Laser atherectomy x 5 passes and JOE x 2 to dRCA ([place] 2.5x26mm and 4.0x22mm) (prelim report; official report to follow)  -Admit to tele overnight secondary to meeting admission criteria s/p laser atherectomy and long lesion (>28mm)  -Hopeful discharge to home in the am  -Bedrest until radial band removed then OOB  -Meds: Maintain Baby ASA/Effient/statin/Ca channel blocker   -Hold Metformin x 48 hours then restart  -cardiac rehab info provided/referral and communication to cardiac rehab completed  -Lifestyle mods/diet/activity/meds discussed with patient verbal understanding  -Benefits of ASA/Effient emphasized with patient verbal understanding  -Discussed with Dr. Mckee

## 2021-07-13 NOTE — DISCHARGE NOTE PROVIDER - HOSPITAL COURSE
51 y/o male former smoker with history of CAD with prior stents, DM, HTN, HLD, PAD who had a nuclear stress test in April which showed a small to moderate sized, mild severity, proximal to mid inferoseptal, partially reversible defect suggestive of ischemia. He had a LHC and PCI of the pLAD. There was significant residual ISR of the dRCA.  Now he is s/p staged LHC/laser atherectomy/PCI via RRA by Dr. Mckee: Laser atherectomy x 5 passes and JOE x 2 to dRCA ([place] 2.5x26mm and 4.0x22mm) (prelim report; official report to follow)  -Admit to tele overnight secondary to meeting admission criteria s/p laser atherectomy  -Hopeful discharge to home in the am  -Bedrest until radial band removed then OOB  -Meds: Maintain Baby ASA/Effient/statin/Ca channel blocker   -Hold Metformin x 48 hours then restart  -cardiac rehab info provided/referral and communication to cardiac rehab completed  -Lifestyle mods/diet/activity/meds discussed with patient verbal understanding  -Benefits of ASA/Effient emphasized with patient verbal understanding  -Discussed with Dr. Mckee

## 2021-07-13 NOTE — DISCHARGE NOTE PROVIDER - CARE PROVIDER_API CALL
Cameron Mckee)  Cardiovascular Disease; Interventional Cardiology  [address] [place] [address]  Phone: [phone]  Fax: [phone]  Established Patient  Follow Up Time: 2 weeks

## 2021-07-13 NOTE — DISCHARGE NOTE PROVIDER - NSDCCPCAREPLAN_GEN_ALL_CORE_FT
PRINCIPAL DISCHARGE DIAGNOSIS  Diagnosis: CAD (coronary atherosclerotic disease)  Assessment and Plan of Treatment: s/p PCI and laser atherectomy

## 2021-07-13 NOTE — DISCHARGE NOTE PROVIDER - NSDCCPTREATMENT_GEN_ALL_CORE_FT
PRINCIPAL PROCEDURE  Procedure: Percutaneous coronary intervention, with stent insertion  Findings and Treatment: Maintain ASA/Effient/Statin/Ca channel blocker

## 2021-07-13 NOTE — PROGRESS NOTE ADULT - SUBJECTIVE AND OBJECTIVE BOX
Cardiology NP post procedure note:     -s/p staged LHC/laser atherectomy/PCI via RRA by Dr. Mckee: Laser atherectomy x 5 passes and JOE x 2 to dRCA (Radha 2.5x26mm and 4.0x22mm) (prelim report; official report to follow)  Heparin 10,000 units x 1  Effient 20mg PO x 1  Omnipaque 102 cc used    EKG post PCI: NSR 61 bpm nonspecific T wave abnormalities unchanged from previous  TELE: NSR 60s    MEDICATIONS  (STANDING):  amLODIPine   Tablet 10 milliGRAM(s) Oral daily  aspirin enteric coated 81 milliGRAM(s) Oral daily  atorvastatin 20 milliGRAM(s) Oral at bedtime  chlorhexidine 4% Liquid 1 Application(s) Topical once  dextrose 40% Gel 15 Gram(s) Oral once  dextrose 5%. 1000 milliLiter(s) (50 mL/Hr) IV Continuous <Continuous>  dextrose 5%. 1000 milliLiter(s) (100 mL/Hr) IV Continuous <Continuous>  dextrose 50% Injectable 25 Gram(s) IV Push once  dextrose 50% Injectable 12.5 Gram(s) IV Push once  dextrose 50% Injectable 25 Gram(s) IV Push once  glucagon  Injectable 1 milliGRAM(s) IntraMuscular once  insulin lispro (ADMELOG) corrective regimen sliding scale   SubCutaneous three times a day before meals  insulin lispro (ADMELOG) corrective regimen sliding scale   SubCutaneous at bedtime  losartan 100 milliGRAM(s) Oral daily    MEDICATIONS  (PRN):  acetaminophen   Tablet .. 650 milliGRAM(s) Oral every 6 hours PRN Mild Pain (1 - 3)  zolpidem 5 milliGRAM(s) Oral at bedtime PRN Insomnia      Allergies:  No Known Allergies      PAST MEDICAL & SURGICAL HISTORY:  Coronary artery disease of native artery of native heart with stable angina pectoris    Type 2 diabetes mellitus with other circulatory complication, without long-term current use of insul    PAD (peripheral artery disease)    Essential hypertension    Pure hypercholesterolemia    S/P appendectomy    Status post insertion of drug-eluting stent into left anterior descending (LAD) artery for coronary  RADHA 4.00 X 12MM drug-eluting stent pLAD    Status post insertion of drug-eluting stent into left anterior descending (LAD) artery for coronary  3.0 X 15 Radha JOE pLAD, 2.5 X 38 [place] JOE mLAD    Presence of drug coated stent in left circumflex coronary artery  3.5 12 Radha JOE pLCX, 2.5 X 38 [place] JOE mLCX    Status post insertion of drug-eluting stent into right coronary artery for coronary artery disease  4.0 X 23 [place] JOE pRCA, 3.5 X 33 [place] JOE mRCA, 3.0 X 38 [place] JOE dRCA        Vital Signs Last 24 Hrs  T(C): 36.3 (13 Jul 2021 11:41), Max: 36.3 (13 Jul 2021 11:41)  T(F): 97.4 (13 Jul 2021 11:41), Max: 97.4 (13 Jul 2021 11:41)  HR: 68 (13 Jul 2021 15:45) (63 - 82)  BP: 133/80 (13 Jul 2021 15:45) (127/73 - 173/82)  BP(mean): --  RR: 16 (13 Jul 2021 15:45) (16 - 16)  SpO2: 98% (13 Jul 2021 15:45) (95% - 100%)    Physical Exam:  Constitutional: NAD, AAOx3  Cardiovascular: +S1S2 RRR  Pulmonary: CTA b/l, unlabored  GI: soft NTND +BS  Extremities: no pedal edema, +distal pulses b/l  Neuro: non focal, BLOCK x4  Procedure site: Right radial band removed     LABS:                        14.9   8.88  )-----------( 346      ( 13 Jul 2021 11:30 )             44.9     07-13    138  |  99  |  10.9  ----------------------------<  201<H>  3.6   |  25.0  |  0.79    Ca    9.9      13 Jul 2021 11:30  Mg     2.2     07-13    TPro  9.1<H>  /  Alb  4.6  /  TBili  0.5  /  DBili  x   /  AST  23  /  ALT  28  /  AlkPhos  85  07-13    RADIOLOGY & ADDITIONAL TESTS:     Cardiology NP post procedure note:     -s/p staged LHC/laser atherectomy/PCI via RRA by Dr. Mckee: Laser atherectomy x 5 passes and JOE x 2 to dRCA (Radha 2.5x26mm and 4.0x22mm) (prelim report; official report to follow)  Heparin 10,000 units x 1  Effient 20mg PO x 1  Omnipaque 102 cc used    EKG post PCI: NSR 61 bpm nonspecific T wave abnormalities unchanged from previous  TELE: NSR 60s    MEDICATIONS  (STANDING):  amLODIPine   Tablet 10 milliGRAM(s) Oral daily  aspirin enteric coated 81 milliGRAM(s) Oral daily  atorvastatin 20 milliGRAM(s) Oral at bedtime  chlorhexidine 4% Liquid 1 Application(s) Topical once  dextrose 40% Gel 15 Gram(s) Oral once  dextrose 5%. 1000 milliLiter(s) (50 mL/Hr) IV Continuous <Continuous>  dextrose 5%. 1000 milliLiter(s) (100 mL/Hr) IV Continuous <Continuous>  dextrose 50% Injectable 25 Gram(s) IV Push once  dextrose 50% Injectable 12.5 Gram(s) IV Push once  dextrose 50% Injectable 25 Gram(s) IV Push once  glucagon  Injectable 1 milliGRAM(s) IntraMuscular once  insulin lispro (ADMELOG) corrective regimen sliding scale   SubCutaneous three times a day before meals  insulin lispro (ADMELOG) corrective regimen sliding scale   SubCutaneous at bedtime  losartan 100 milliGRAM(s) Oral daily    MEDICATIONS  (PRN):  acetaminophen   Tablet .. 650 milliGRAM(s) Oral every 6 hours PRN Mild Pain (1 - 3)  zolpidem 5 milliGRAM(s) Oral at bedtime PRN Insomnia      Allergies:  No Known Allergies      PAST MEDICAL & SURGICAL HISTORY:  Coronary artery disease of native artery of native heart with stable angina pectoris    Type 2 diabetes mellitus with other circulatory complication, without long-term current use of insul    PAD (peripheral artery disease)    Essential hypertension    Pure hypercholesterolemia    S/P appendectomy    Status post insertion of drug-eluting stent into left anterior descending (LAD) artery for coronary  RADHA 4.00 X 12MM drug-eluting stent pLAD    Status post insertion of drug-eluting stent into left anterior descending (LAD) artery for coronary  3.0 X 15 Radha JOE pLAD, 2.5 X 38 [place] JOE mLAD    Presence of drug coated stent in left circumflex coronary artery  3.5 12 Radha JOE pLCX, 2.5 X 38 [place] JOE mLCX    Status post insertion of drug-eluting stent into right coronary artery for coronary artery disease  4.0 X 23 [place] JOE pRCA, 3.5 X 33 [place] JOE mRCA, 3.0 X 38 [place] JOE dRCA        Vital Signs Last 24 Hrs  T(C): 36.3 (13 Jul 2021 11:41), Max: 36.3 (13 Jul 2021 11:41)  T(F): 97.4 (13 Jul 2021 11:41), Max: 97.4 (13 Jul 2021 11:41)  HR: 68 (13 Jul 2021 15:45) (63 - 82)  BP: 133/80 (13 Jul 2021 15:45) (127/73 - 173/82)  BP(mean): --  RR: 16 (13 Jul 2021 15:45) (16 - 16)  SpO2: 98% (13 Jul 2021 15:45) (95% - 100%)    Physical Exam:  Constitutional: NAD, AAOx3  Cardiovascular: +S1S2 RRR  Pulmonary: CTA b/l, unlabored  GI: soft NTND +BS  Extremities: no pedal edema, +distal pulses b/l  Neuro: non focal, BLOCK x4  Procedure site: Right radial band removed by RN at 1545; site benign without hematoma/bleeding; RUE warm/mobile/acyanotic; + right radial pulse    LABS:                        14.9   8.88  )-----------( 346      ( 13 Jul 2021 11:30 )             44.9     07-13    138  |  99  |  10.9  ----------------------------<  201<H>  3.6   |  25.0  |  0.79    Ca    9.9      13 Jul 2021 11:30  Mg     2.2     07-13    TPro  9.1<H>  /  Alb  4.6  /  TBili  0.5  /  DBili  x   /  AST  23  /  ALT  28  /  AlkPhos  85  07-13    RADIOLOGY & ADDITIONAL TESTS:

## 2021-07-14 ENCOUNTER — TRANSCRIPTION ENCOUNTER (OUTPATIENT)
Age: 51
End: 2021-07-14

## 2021-07-14 VITALS
HEART RATE: 61 BPM | OXYGEN SATURATION: 98 % | SYSTOLIC BLOOD PRESSURE: 147 MMHG | RESPIRATION RATE: 20 BRPM | TEMPERATURE: 98 F | DIASTOLIC BLOOD PRESSURE: 71 MMHG

## 2021-07-14 LAB
ANION GAP SERPL CALC-SCNC: 11 MMOL/L — SIGNIFICANT CHANGE UP (ref 5–17)
BASOPHILS # BLD AUTO: 0.03 K/UL — SIGNIFICANT CHANGE UP (ref 0–0.2)
BASOPHILS NFR BLD AUTO: 0.4 % — SIGNIFICANT CHANGE UP (ref 0–2)
BUN SERPL-MCNC: 13 MG/DL — SIGNIFICANT CHANGE UP (ref 8–20)
CALCIUM SERPL-MCNC: 8.8 MG/DL — SIGNIFICANT CHANGE UP (ref 8.6–10.2)
CHLORIDE SERPL-SCNC: 100 MMOL/L — SIGNIFICANT CHANGE UP (ref 98–107)
CO2 SERPL-SCNC: 24 MMOL/L — SIGNIFICANT CHANGE UP (ref 22–29)
CREAT SERPL-MCNC: 0.67 MG/DL — SIGNIFICANT CHANGE UP (ref 0.5–1.3)
EOSINOPHIL # BLD AUTO: 0.19 K/UL — SIGNIFICANT CHANGE UP (ref 0–0.5)
EOSINOPHIL NFR BLD AUTO: 2.6 % — SIGNIFICANT CHANGE UP (ref 0–6)
GLUCOSE SERPL-MCNC: 223 MG/DL — HIGH (ref 70–99)
HCT VFR BLD CALC: 38.4 % — LOW (ref 39–50)
HGB BLD-MCNC: 12.9 G/DL — LOW (ref 13–17)
IMM GRANULOCYTES NFR BLD AUTO: 0.3 % — SIGNIFICANT CHANGE UP (ref 0–1.5)
LYMPHOCYTES # BLD AUTO: 1.85 K/UL — SIGNIFICANT CHANGE UP (ref 1–3.3)
LYMPHOCYTES # BLD AUTO: 25.1 % — SIGNIFICANT CHANGE UP (ref 13–44)
MCHC RBC-ENTMCNC: 28.4 PG — SIGNIFICANT CHANGE UP (ref 27–34)
MCHC RBC-ENTMCNC: 33.6 GM/DL — SIGNIFICANT CHANGE UP (ref 32–36)
MCV RBC AUTO: 84.6 FL — SIGNIFICANT CHANGE UP (ref 80–100)
MONOCYTES # BLD AUTO: 0.5 K/UL — SIGNIFICANT CHANGE UP (ref 0–0.9)
MONOCYTES NFR BLD AUTO: 6.8 % — SIGNIFICANT CHANGE UP (ref 2–14)
NEUTROPHILS # BLD AUTO: 4.78 K/UL — SIGNIFICANT CHANGE UP (ref 1.8–7.4)
NEUTROPHILS NFR BLD AUTO: 64.8 % — SIGNIFICANT CHANGE UP (ref 43–77)
PLATELET # BLD AUTO: 290 K/UL — SIGNIFICANT CHANGE UP (ref 150–400)
POTASSIUM SERPL-MCNC: 4.4 MMOL/L — SIGNIFICANT CHANGE UP (ref 3.5–5.3)
POTASSIUM SERPL-SCNC: 4.4 MMOL/L — SIGNIFICANT CHANGE UP (ref 3.5–5.3)
RBC # BLD: 4.54 M/UL — SIGNIFICANT CHANGE UP (ref 4.2–5.8)
RBC # FLD: 11.9 % — SIGNIFICANT CHANGE UP (ref 10.3–14.5)
SODIUM SERPL-SCNC: 135 MMOL/L — SIGNIFICANT CHANGE UP (ref 135–145)
WBC # BLD: 7.37 K/UL — SIGNIFICANT CHANGE UP (ref 3.8–10.5)
WBC # FLD AUTO: 7.37 K/UL — SIGNIFICANT CHANGE UP (ref 3.8–10.5)

## 2021-07-14 PROCEDURE — 99153 MOD SED SAME PHYS/QHP EA: CPT

## 2021-07-14 PROCEDURE — C1874: CPT

## 2021-07-14 PROCEDURE — C1753: CPT

## 2021-07-14 PROCEDURE — 83735 ASSAY OF MAGNESIUM: CPT

## 2021-07-14 PROCEDURE — 86900 BLOOD TYPING SEROLOGIC ABO: CPT

## 2021-07-14 PROCEDURE — C1894: CPT

## 2021-07-14 PROCEDURE — 99152 MOD SED SAME PHYS/QHP 5/>YRS: CPT

## 2021-07-14 PROCEDURE — 93005 ELECTROCARDIOGRAM TRACING: CPT

## 2021-07-14 PROCEDURE — 93458 L HRT ARTERY/VENTRICLE ANGIO: CPT | Mod: 59

## 2021-07-14 PROCEDURE — 85025 COMPLETE CBC W/AUTO DIFF WBC: CPT

## 2021-07-14 PROCEDURE — C9601: CPT | Mod: RC

## 2021-07-14 PROCEDURE — 36415 COLL VENOUS BLD VENIPUNCTURE: CPT

## 2021-07-14 PROCEDURE — C9602: CPT | Mod: RC

## 2021-07-14 PROCEDURE — C1769: CPT

## 2021-07-14 PROCEDURE — 80048 BASIC METABOLIC PNL TOTAL CA: CPT

## 2021-07-14 PROCEDURE — 80053 COMPREHEN METABOLIC PANEL: CPT

## 2021-07-14 PROCEDURE — C1887: CPT

## 2021-07-14 PROCEDURE — 82962 GLUCOSE BLOOD TEST: CPT

## 2021-07-14 PROCEDURE — 86901 BLOOD TYPING SEROLOGIC RH(D): CPT

## 2021-07-14 PROCEDURE — 92978 ENDOLUMINL IVUS OCT C 1ST: CPT | Mod: RC

## 2021-07-14 PROCEDURE — C1885: CPT

## 2021-07-14 PROCEDURE — 93010 ELECTROCARDIOGRAM REPORT: CPT

## 2021-07-14 PROCEDURE — 86850 RBC ANTIBODY SCREEN: CPT

## 2021-07-14 PROCEDURE — C1725: CPT

## 2021-07-14 RX ORDER — METFORMIN HYDROCHLORIDE 850 MG/1
1 TABLET ORAL
Qty: 0 | Refills: 0 | DISCHARGE

## 2021-07-14 RX ORDER — ZOLPIDEM TARTRATE 10 MG/1
1 TABLET ORAL
Qty: 0 | Refills: 0 | DISCHARGE
Start: 2021-07-14

## 2021-07-14 RX ADMIN — AMLODIPINE BESYLATE 10 MILLIGRAM(S): 2.5 TABLET ORAL at 05:58

## 2021-07-14 RX ADMIN — LOSARTAN POTASSIUM 100 MILLIGRAM(S): 100 TABLET, FILM COATED ORAL at 05:58

## 2021-07-14 RX ADMIN — Medication 81 MILLIGRAM(S): at 08:14

## 2021-07-14 RX ADMIN — PRASUGREL 10 MILLIGRAM(S): 5 TABLET, FILM COATED ORAL at 08:14

## 2021-07-14 RX ADMIN — Medication 4: at 07:11

## 2021-07-14 NOTE — DISCHARGE NOTE NURSING/CASE MANAGEMENT/SOCIAL WORK - PATIENT PORTAL LINK FT
You can access the FollowMyHealth Patient Portal offered by [facility] by registering at the following website: [email or web address]. By joining [email or web address]’s FollowMyHealth portal, you will also be able to view your health information using other applications (apps) compatible with our system.

## 2021-07-14 NOTE — PROGRESS NOTE ADULT - SUBJECTIVE AND OBJECTIVE BOX
[place] [facility]                          [address] [place] [address]                       Phone: [phone]. Fax:[phone]                      ________________________________________________           Reason for follow up/Overnight events: Patient SP PCI to the dRCA yesterday and was admitted overnight for observation.  No events overnight.  Patient denies CP or SOB.    HPI:  51y/o male former smoker with history of CAD with prior stents, DM, HTN, HLD, PAD who had a nuclear stress test in April which showed a small to moderate sized, mild severity, proximal to mid inferoseptal, partially reversible defect suggestive of ischemia. He had a LHC and PCI of the pLAD. There was significant residual ISR of the dRCA.  He presents today for staged PCI/laser artherectomy of the dRCA with Dr. Mckee. Dressing to R radial procedure site removed. Site ALBERTINA, no S/S bleeding or hematoma.    Symptoms:        Angina (Class): 3       Ischemic Symptoms: WOODARD    Heart Failure: N/A    Assessment of LVEF:       EF: 60-65%       Assessed by: Echo       Date: 3/29/2021    Prior Cardiac Interventions:       PCI's: 5/25/2021  VENTRICLES: There were no left ventricular global or regional wall motion abnormalities. Global left ventricular function was normal. EF estimated was 60 %.  VALVES: MITRAL VALVE: The mitral valve exhibited trivial regurgitation (less than 1+).  CORONARY VESSELS: The coronary circulation is right dominant.  LM:     --  Mid left main: There was a tubular 20 % stenosis.  LAD:     --  Proximal LAD: There was a tubular 90 % stenosis at the proximal margin of the stented segment. MLA by IVUS is 2.8sq mm ,indicating a significant stenosis. It was treated with an RADHA 4.00 X 12MM drug-eluting stent. In a second lesion, there was a tubular 0 % stenosis at the site of a prior stent (3.0 X 15 [place] JOE).  --  Mid LAD: There was a diffuse 20 % stenosis at the site of a prior stent (2.5 X 38 [place] JOE).  CX:     --  Proximal circumflex: There was a tubular 0 % stenosis at the site of a prior stent (3.5 12 Radha JOE).  --  Mid circumflex: There was a diffuse 0 % stenosis at the site of a prior stent (2.5 X 38 Radha JOE).  RCA:     --  Proximal RCA: There was a diffuse 0 % stenosis at the site of a prior stent (4.0 X 23 Xience JOE).  --  Mid RCA: There was a tubular 20 % stenosis at the site of a prior stent (3.5 X 33 Xience JOE).  --  Distal RCA: There was a tubular 70 % stenosis at the site of a prior stent (3.0 X 38 Xience JOE).  --  RPDA: There was a tubular 85 % stenosis.  AORTA: Abdominal aorta: The segment was normal in size. There is minimal atherosclerotic changes. Renal arteries are patent bilaterally without any significant disease.  LEFT LOWER EXTREMITY VESSELS: Left lower extremity angiography reveals moderate atherosclerosis.   ·	Left common iliac: Normal.   ·	Left external iliac: Normal.   ·	Left common femoral: Normal.   ·	Mid left superficial femoral: There was a diffuse 40 % stenosis.   ·	Left popliteal: Normal.   ·	Proximal left anterior tibial: There was a tubular 80 % stenosis.   ·	Left tibio-peroneal: Normal.   ·	Left peroneal: Angiography showed mild atherosclerosis.  RIGHT LOWER EXTREMITY VESSELS: Right lower extremity angiography reveals severe atherosclerosis.   ·	Right common iliac: Normal.   ·	Proximal right common iliac: There was a diffuse 20 % stenosis.   ·	Right external iliac: Normal.  ·	Proximal right superficial femoral: There was a tubular 20 % stenosis.   ·	Mid right superficial femoral: There was a tubular 90 % stenosis.   ·	Right popliteal: Normal.   ·	Right anterior tibial: The vessel was small. Angiography showed moderate atherosclerosis.   ·	Right posterior tibial: Angiography showed minor luminal irregularities.   ·	Right peroneal: Angiography showed mild atherosclerosis.         CABG: N/A    Noninvasive Testing:   Stress Test: 4/1/2021       Protocol: Wallace       Duration of Exercise: 7:00       Symptoms: dyspnea, fatigue, claudication       EKG Changes: 2.5 mm downsloping ST depressions in the inferolateral leads.       DTS: -5.5       Myocardial Imaging: Small to moderate sized, mild severity, proximal to mid inferoseptal, partially reversible defect suggestive of ischemia    Echo: 3/29/2021       LV: Normal with EF of 60-65%, grade 1 LVDD.       RV: Normal       LA: Normal       RA: Normal       Mitral Valve: Normal with trace MR.       Aortic Valve: Normal        Tricuspid Valve: Normal trace TR       Pulmonic Valve: Normal       Pericardium: No pericardial effusion    Antianginal Therapies:        Beta Blockers: N/A       Calcium Channel Blockers: Amlodipine 10 mg daily       Long Acting Nitrates: N/A       Ranexa: N/A    Associated Risk Factors:        Cerebrovascular Disease: N/A       Chronic Lung Disease: N/A       Peripheral Arterial Disease: N/A       Chronic Kidney Disease (if yes, what is GFR): N/A       Uncontrolled Diabetes (if yes, what is HgbA1C or FBS): N/A       Poorly Controlled Hypertension (if yes, what is SBP): N/A       Morbid Obesity (if yes, what is BMI): N/A       History of Recent Ventricular Arrhythmia: N/A       Inability to Ambulate Safely: N/A       Need for Therapeutic Anticoagulation: N/A       Antiplatelet or Contrast Allergy: N/A (12 Jul 2021 16:52)      ROS: All review of systems negative unless indicated otherwise below.     Vital Signs Last 24 Hrs  T(C): 36.7 (14 Jul 2021 07:18), Max: 36.7 (14 Jul 2021 05:50)  T(F): 98.1 (14 Jul 2021 07:18), Max: 98.1 (14 Jul 2021 07:18)  HR: 61 (14 Jul 2021 07:18) (61 - 82)  BP: 147/71 (14 Jul 2021 07:18) (127/73 - 173/82)  BP(mean): --  RR: 20 (14 Jul 2021 07:18) (16 - 20)  SpO2: 98% (14 Jul 2021 07:18) (95% - 100%)                                                                                                        LAB RESULTS                 COMPLETE BLOOD COUNT( 14 Jul 2021 05:56 )                            12.9 g/dL<L>  7.37 K/uL )---------------( 290 K/uL                        38.4 %<L>      Automated Differential     Auto Basophil # - 0.03 K/uL  Auto Basophil % - 0.4 %  Auto Eosinophil # - 0.19 K/uL  Auto Eosinophil % - 2.6 %  Auto Immature Granulocyte # - X      Auto Immature Granulocyte % - 0.3 %  Auto Lymphocyte # - 1.85 K/uL  Auto Lymphocyte % - 25.1 %  Auto Monocyte # - 0.50 K/uL  Auto Monocyte % - 6.8 %  Auto Neutrophil # - 4.78 K/uL  Auto Neutrophil % - 64.8 %                                  CHEMISTRY                 Basic Metabolic Panel (07-14-21 @ 05:56)    135  |  100  |  13.0  ----------------------------<  223<H>  4.4   |  24.0  |  0.67    Ca    8.8      14 Jul 2021 05:56  Mg     2.2     07-13                    Liver Functions (07-13-21 @ 11:30))  TPro  9.1  /  Alb  4.6  /  TBili  0.5  /  DBili  x   /  AST  23  /  ALT  28  /  AlkPhos  85                                  HOME MEDICATIONS:  amLODIPine 10 mg oral tablet: 1 tab(s) orally once a day (13 Jul 2021 11:38)  Aspir 81 oral delayed release tablet: 1 tab(s) orally once a day (13 Jul 2021 11:38)  atorvastatin 20 mg oral tablet: 1 tab(s) orally once a day (13 Jul 2021 11:38)  glipiZIDE 10 mg oral tablet: 1 tab(s) orally 2 times a day (13 Jul 2021 11:38)  losartan 100 mg oral tablet: 1 tab(s) orally once a day (13 Jul 2021 11:38)  metFORMIN 1000 mg oral tablet: 1 tab(s) orally 2 times a day  Hold until 7/16 then you can resume it (14 Jul 2021 09:40)  prasugrel 10 mg oral tablet: 1 tab(s) orally once a day (13 Jul 2021 11:38)  zolpidem 5 mg oral tablet: 1 tab(s) orally once a day (at bedtime), As needed, Insomnia (14 Jul 2021 09:40)                             Current Admission Active Medications    acetaminophen   Tablet .. 650 milliGRAM(s) Oral every 6 hours PRN Mild Pain (1 - 3)  amLODIPine   Tablet 10 milliGRAM(s) Oral daily  aspirin enteric coated 81 milliGRAM(s) Oral daily  atorvastatin 20 milliGRAM(s) Oral at bedtime  chlorhexidine 4% Liquid 1 Application(s) Topical once  dextrose 40% Gel 15 Gram(s) Oral once  dextrose 5%. 1000 milliLiter(s) (50 mL/Hr) IV Continuous <Continuous>  dextrose 5%. 1000 milliLiter(s) (100 mL/Hr) IV Continuous <Continuous>  dextrose 50% Injectable 25 Gram(s) IV Push once  dextrose 50% Injectable 12.5 Gram(s) IV Push once  dextrose 50% Injectable 25 Gram(s) IV Push once  glucagon  Injectable 1 milliGRAM(s) IntraMuscular once  insulin lispro (ADMELOG) corrective regimen sliding scale   SubCutaneous three times a day before meals  insulin lispro (ADMELOG) corrective regimen sliding scale   SubCutaneous at bedtime  losartan 100 milliGRAM(s) Oral daily  prasugrel 10 milliGRAM(s) Oral daily  zolpidem 5 milliGRAM(s) Oral at bedtime PRN Insomnia                        PHYSICAL EXAM:    GENERAL: NAD  NECK: Supple, No JVD  NERVOUS SYSTEM:  Alert & Oriented X3, non focal neuro exam.   CHEST/LUNG: clear lungs, No rales, rhonchi, wheezing, or rubs  HEART: Regular rate and rhythm; s1 and s2 auscultated, No murmurs, rubs, or gallops  ABDOMEN: Soft, Nontender, Nondistended; Bowel sounds present and normoactive.   EXTREMITIES:  2+ Peripheral Pulses, No clubbing, cyanosis, or edema     A/P: 49 y/o male former smoker with history of CAD with prior stents, DM, HTN, HLD, PAD who had a nuclear stress test in April which showed a small to moderate sized, mild severity, proximal to mid inferoseptal, partially reversible defect suggestive of ischemia. He had a LHC and PCI of the pLAD. There was significant residual ISR of the dRCA.  Now he is s/p staged LHC/laser atherectomy/PCI via RRA by Dr. Mckee: Laser atherectomy x 5 passes and JOE x 2 to dRCA ([place] 2.5x26mm and 4.0x22mm)-Admit to tele overnight secondary to meeting admission criteria s/p laser atherectomy and long lesion   -Discharge to home  -Meds: Maintain Baby ASA/Effient/statin/Ca channel blocker   -Hold Metformin x 48 hours then restart  -cardiac rehab info provided  -Lifestyle mods/diet/activity/meds discussed with patient verbal understanding  -Benefits of ASA/Effient emphasized with patient verbal understanding  -Discussed with Dr. Mckee

## 2021-10-29 ENCOUNTER — TRANSCRIPTION ENCOUNTER (OUTPATIENT)
Age: 51
End: 2021-10-29

## 2021-10-29 ENCOUNTER — INPATIENT (INPATIENT)
Facility: HOSPITAL | Age: 51
LOS: 0 days | Discharge: HOME | DRG: 287 | End: 2021-10-29
Attending: INTERNAL MEDICINE | Admitting: INTERNAL MEDICINE
Payer: COMMERCIAL

## 2021-10-29 VITALS
WEIGHT: 199.96 LBS | DIASTOLIC BLOOD PRESSURE: 86 MMHG | RESPIRATION RATE: 16 BRPM | HEART RATE: 80 BPM | SYSTOLIC BLOOD PRESSURE: 149 MMHG | HEIGHT: 67 IN | TEMPERATURE: 99 F | OXYGEN SATURATION: 100 %

## 2021-10-29 VITALS — HEART RATE: 71 BPM

## 2021-10-29 DIAGNOSIS — Z95.5 PRESENCE OF CORONARY ANGIOPLASTY IMPLANT AND GRAFT: Chronic | ICD-10-CM

## 2021-10-29 DIAGNOSIS — Z90.49 ACQUIRED ABSENCE OF OTHER SPECIFIED PARTS OF DIGESTIVE TRACT: Chronic | ICD-10-CM

## 2021-10-29 DIAGNOSIS — E78.5 HYPERLIPIDEMIA, UNSPECIFIED: ICD-10-CM

## 2021-10-29 DIAGNOSIS — I25.10 ATHEROSCLEROTIC HEART DISEASE OF NATIVE CORONARY ARTERY WITHOUT ANGINA PECTORIS: ICD-10-CM

## 2021-10-29 DIAGNOSIS — R07.9 CHEST PAIN, UNSPECIFIED: ICD-10-CM

## 2021-10-29 DIAGNOSIS — I20.9 ANGINA PECTORIS, UNSPECIFIED: ICD-10-CM

## 2021-10-29 DIAGNOSIS — I10 ESSENTIAL (PRIMARY) HYPERTENSION: ICD-10-CM

## 2021-10-29 DIAGNOSIS — I73.9 PERIPHERAL VASCULAR DISEASE, UNSPECIFIED: ICD-10-CM

## 2021-10-29 DIAGNOSIS — E11.9 TYPE 2 DIABETES MELLITUS WITHOUT COMPLICATIONS: ICD-10-CM

## 2021-10-29 PROBLEM — E78.00 PURE HYPERCHOLESTEROLEMIA, UNSPECIFIED: Chronic | Status: ACTIVE | Noted: 2021-07-12

## 2021-10-29 PROBLEM — E11.59 TYPE 2 DIABETES MELLITUS WITH OTHER CIRCULATORY COMPLICATIONS: Chronic | Status: ACTIVE | Noted: 2021-07-12

## 2021-10-29 PROBLEM — I25.118 ATHEROSCLEROTIC HEART DISEASE OF NATIVE CORONARY ARTERY WITH OTHER FORMS OF ANGINA PECTORIS: Chronic | Status: ACTIVE | Noted: 2021-07-12

## 2021-10-29 LAB
A1C WITH ESTIMATED AVERAGE GLUCOSE RESULT: 7.1 % — HIGH (ref 4–5.6)
ALBUMIN SERPL ELPH-MCNC: 4.3 G/DL — SIGNIFICANT CHANGE UP (ref 3.3–5.2)
ALP SERPL-CCNC: 81 U/L — SIGNIFICANT CHANGE UP (ref 40–120)
ALT FLD-CCNC: 29 U/L — SIGNIFICANT CHANGE UP
ANION GAP SERPL CALC-SCNC: 13 MMOL/L — SIGNIFICANT CHANGE UP (ref 5–17)
APTT BLD: 33.2 SEC — SIGNIFICANT CHANGE UP (ref 27.5–35.5)
AST SERPL-CCNC: 18 U/L — SIGNIFICANT CHANGE UP
BASOPHILS # BLD AUTO: 0.03 K/UL — SIGNIFICANT CHANGE UP (ref 0–0.2)
BASOPHILS NFR BLD AUTO: 0.4 % — SIGNIFICANT CHANGE UP (ref 0–2)
BILIRUB SERPL-MCNC: 0.5 MG/DL — SIGNIFICANT CHANGE UP (ref 0.4–2)
BLD GP AB SCN SERPL QL: SIGNIFICANT CHANGE UP
BUN SERPL-MCNC: 7 MG/DL — LOW (ref 8–20)
CALCIUM SERPL-MCNC: 9.4 MG/DL — SIGNIFICANT CHANGE UP (ref 8.6–10.2)
CHLORIDE SERPL-SCNC: 96 MMOL/L — LOW (ref 98–107)
CHOLEST SERPL-MCNC: 131 MG/DL — SIGNIFICANT CHANGE UP
CO2 SERPL-SCNC: 27 MMOL/L — SIGNIFICANT CHANGE UP (ref 22–29)
CREAT SERPL-MCNC: 0.77 MG/DL — SIGNIFICANT CHANGE UP (ref 0.5–1.3)
EOSINOPHIL # BLD AUTO: 0.13 K/UL — SIGNIFICANT CHANGE UP (ref 0–0.5)
EOSINOPHIL NFR BLD AUTO: 1.6 % — SIGNIFICANT CHANGE UP (ref 0–6)
ESTIMATED AVERAGE GLUCOSE: 157 MG/DL — HIGH (ref 68–114)
GLUCOSE SERPL-MCNC: 155 MG/DL — HIGH (ref 70–99)
HCT VFR BLD CALC: 39.8 % — SIGNIFICANT CHANGE UP (ref 39–50)
HDLC SERPL-MCNC: 43 MG/DL — SIGNIFICANT CHANGE UP
HGB BLD-MCNC: 13.9 G/DL — SIGNIFICANT CHANGE UP (ref 13–17)
IMM GRANULOCYTES NFR BLD AUTO: 0.1 % — SIGNIFICANT CHANGE UP (ref 0–1.5)
INR BLD: 1.12 RATIO — SIGNIFICANT CHANGE UP (ref 0.88–1.16)
LIPID PNL WITH DIRECT LDL SERPL: 57 MG/DL — SIGNIFICANT CHANGE UP
LYMPHOCYTES # BLD AUTO: 2.18 K/UL — SIGNIFICANT CHANGE UP (ref 1–3.3)
LYMPHOCYTES # BLD AUTO: 27.3 % — SIGNIFICANT CHANGE UP (ref 13–44)
MCHC RBC-ENTMCNC: 29 PG — SIGNIFICANT CHANGE UP (ref 27–34)
MCHC RBC-ENTMCNC: 34.9 GM/DL — SIGNIFICANT CHANGE UP (ref 32–36)
MCV RBC AUTO: 82.9 FL — SIGNIFICANT CHANGE UP (ref 80–100)
MONOCYTES # BLD AUTO: 0.44 K/UL — SIGNIFICANT CHANGE UP (ref 0–0.9)
MONOCYTES NFR BLD AUTO: 5.5 % — SIGNIFICANT CHANGE UP (ref 2–14)
NEUTROPHILS # BLD AUTO: 5.19 K/UL — SIGNIFICANT CHANGE UP (ref 1.8–7.4)
NEUTROPHILS NFR BLD AUTO: 65.1 % — SIGNIFICANT CHANGE UP (ref 43–77)
NON HDL CHOLESTEROL: 88 MG/DL — SIGNIFICANT CHANGE UP
PLATELET # BLD AUTO: 346 K/UL — SIGNIFICANT CHANGE UP (ref 150–400)
POTASSIUM SERPL-MCNC: 3.5 MMOL/L — SIGNIFICANT CHANGE UP (ref 3.5–5.3)
POTASSIUM SERPL-SCNC: 3.5 MMOL/L — SIGNIFICANT CHANGE UP (ref 3.5–5.3)
PROT SERPL-MCNC: 8.2 G/DL — SIGNIFICANT CHANGE UP (ref 6.6–8.7)
PROTHROM AB SERPL-ACNC: 12.9 SEC — SIGNIFICANT CHANGE UP (ref 10.6–13.6)
RBC # BLD: 4.8 M/UL — SIGNIFICANT CHANGE UP (ref 4.2–5.8)
RBC # FLD: 11.9 % — SIGNIFICANT CHANGE UP (ref 10.3–14.5)
SARS-COV-2 RNA SPEC QL NAA+PROBE: SIGNIFICANT CHANGE UP
SODIUM SERPL-SCNC: 136 MMOL/L — SIGNIFICANT CHANGE UP (ref 135–145)
TRIGL SERPL-MCNC: 155 MG/DL — HIGH
TROPONIN T SERPL-MCNC: <0.01 NG/ML — SIGNIFICANT CHANGE UP (ref 0–0.06)
TSH SERPL-MCNC: 1.26 UIU/ML — SIGNIFICANT CHANGE UP (ref 0.27–4.2)
WBC # BLD: 7.98 K/UL — SIGNIFICANT CHANGE UP (ref 3.8–10.5)
WBC # FLD AUTO: 7.98 K/UL — SIGNIFICANT CHANGE UP (ref 3.8–10.5)

## 2021-10-29 PROCEDURE — 99152 MOD SED SAME PHYS/QHP 5/>YRS: CPT

## 2021-10-29 PROCEDURE — U0003: CPT

## 2021-10-29 PROCEDURE — 85025 COMPLETE CBC W/AUTO DIFF WBC: CPT

## 2021-10-29 PROCEDURE — 85610 PROTHROMBIN TIME: CPT

## 2021-10-29 PROCEDURE — 83036 HEMOGLOBIN GLYCOSYLATED A1C: CPT

## 2021-10-29 PROCEDURE — 99153 MOD SED SAME PHYS/QHP EA: CPT

## 2021-10-29 PROCEDURE — 84443 ASSAY THYROID STIM HORMONE: CPT

## 2021-10-29 PROCEDURE — 99285 EMERGENCY DEPT VISIT HI MDM: CPT

## 2021-10-29 PROCEDURE — 93458 L HRT ARTERY/VENTRICLE ANGIO: CPT

## 2021-10-29 PROCEDURE — 85730 THROMBOPLASTIN TIME PARTIAL: CPT

## 2021-10-29 PROCEDURE — 93010 ELECTROCARDIOGRAM REPORT: CPT

## 2021-10-29 PROCEDURE — 86850 RBC ANTIBODY SCREEN: CPT

## 2021-10-29 PROCEDURE — U0005: CPT

## 2021-10-29 PROCEDURE — 36415 COLL VENOUS BLD VENIPUNCTURE: CPT

## 2021-10-29 PROCEDURE — 93005 ELECTROCARDIOGRAM TRACING: CPT

## 2021-10-29 PROCEDURE — 80061 LIPID PANEL: CPT

## 2021-10-29 PROCEDURE — 99223 1ST HOSP IP/OBS HIGH 75: CPT

## 2021-10-29 PROCEDURE — 86900 BLOOD TYPING SEROLOGIC ABO: CPT

## 2021-10-29 PROCEDURE — C1894: CPT

## 2021-10-29 PROCEDURE — 86901 BLOOD TYPING SEROLOGIC RH(D): CPT

## 2021-10-29 PROCEDURE — 71045 X-RAY EXAM CHEST 1 VIEW: CPT | Mod: 26

## 2021-10-29 PROCEDURE — C1887: CPT

## 2021-10-29 PROCEDURE — C1769: CPT

## 2021-10-29 PROCEDURE — 80053 COMPREHEN METABOLIC PANEL: CPT

## 2021-10-29 PROCEDURE — 84484 ASSAY OF TROPONIN QUANT: CPT

## 2021-10-29 PROCEDURE — 71045 X-RAY EXAM CHEST 1 VIEW: CPT

## 2021-10-29 RX ORDER — PANTOPRAZOLE SODIUM 20 MG/1
1 TABLET, DELAYED RELEASE ORAL
Qty: 60 | Refills: 1
Start: 2021-10-29

## 2021-10-29 RX ORDER — DEXTROSE 50 % IN WATER 50 %
12.5 SYRINGE (ML) INTRAVENOUS ONCE
Refills: 0 | Status: DISCONTINUED | OUTPATIENT
Start: 2021-10-29 | End: 2021-10-29

## 2021-10-29 RX ORDER — ACETAMINOPHEN 500 MG
650 TABLET ORAL EVERY 6 HOURS
Refills: 0 | Status: DISCONTINUED | OUTPATIENT
Start: 2021-10-29 | End: 2021-10-29

## 2021-10-29 RX ORDER — ENOXAPARIN SODIUM 100 MG/ML
40 INJECTION SUBCUTANEOUS DAILY
Refills: 0 | Status: DISCONTINUED | OUTPATIENT
Start: 2021-10-29 | End: 2021-10-29

## 2021-10-29 RX ORDER — PRASUGREL 5 MG/1
10 TABLET, FILM COATED ORAL DAILY
Refills: 0 | Status: DISCONTINUED | OUTPATIENT
Start: 2021-10-29 | End: 2021-10-29

## 2021-10-29 RX ORDER — DEXTROSE 50 % IN WATER 50 %
25 SYRINGE (ML) INTRAVENOUS ONCE
Refills: 0 | Status: DISCONTINUED | OUTPATIENT
Start: 2021-10-29 | End: 2021-10-29

## 2021-10-29 RX ORDER — SODIUM CHLORIDE 9 MG/ML
1000 INJECTION, SOLUTION INTRAVENOUS
Refills: 0 | Status: DISCONTINUED | OUTPATIENT
Start: 2021-10-29 | End: 2021-10-29

## 2021-10-29 RX ORDER — LOSARTAN POTASSIUM 100 MG/1
100 TABLET, FILM COATED ORAL DAILY
Refills: 0 | Status: DISCONTINUED | OUTPATIENT
Start: 2021-10-29 | End: 2021-10-29

## 2021-10-29 RX ORDER — AMLODIPINE BESYLATE 2.5 MG/1
10 TABLET ORAL DAILY
Refills: 0 | Status: DISCONTINUED | OUTPATIENT
Start: 2021-10-29 | End: 2021-10-29

## 2021-10-29 RX ORDER — GLUCAGON INJECTION, SOLUTION 0.5 MG/.1ML
1 INJECTION, SOLUTION SUBCUTANEOUS ONCE
Refills: 0 | Status: DISCONTINUED | OUTPATIENT
Start: 2021-10-29 | End: 2021-10-29

## 2021-10-29 RX ORDER — ATORVASTATIN CALCIUM 80 MG/1
20 TABLET, FILM COATED ORAL AT BEDTIME
Refills: 0 | Status: DISCONTINUED | OUTPATIENT
Start: 2021-10-29 | End: 2021-10-29

## 2021-10-29 RX ORDER — PANTOPRAZOLE SODIUM 20 MG/1
40 TABLET, DELAYED RELEASE ORAL
Refills: 0 | Status: DISCONTINUED | OUTPATIENT
Start: 2021-10-29 | End: 2021-10-29

## 2021-10-29 RX ORDER — NITROGLYCERIN 6.5 MG
0.4 CAPSULE, EXTENDED RELEASE ORAL
Refills: 0 | Status: DISCONTINUED | OUTPATIENT
Start: 2021-10-29 | End: 2021-10-29

## 2021-10-29 RX ORDER — METFORMIN HYDROCHLORIDE 850 MG/1
1 TABLET ORAL
Qty: 0 | Refills: 0 | DISCHARGE

## 2021-10-29 RX ORDER — ASPIRIN/CALCIUM CARB/MAGNESIUM 324 MG
81 TABLET ORAL DAILY
Refills: 0 | Status: DISCONTINUED | OUTPATIENT
Start: 2021-10-29 | End: 2021-10-29

## 2021-10-29 RX ORDER — INSULIN LISPRO 100/ML
VIAL (ML) SUBCUTANEOUS
Refills: 0 | Status: DISCONTINUED | OUTPATIENT
Start: 2021-10-29 | End: 2021-10-29

## 2021-10-29 RX ORDER — DEXTROSE 50 % IN WATER 50 %
15 SYRINGE (ML) INTRAVENOUS ONCE
Refills: 0 | Status: DISCONTINUED | OUTPATIENT
Start: 2021-10-29 | End: 2021-10-29

## 2021-10-29 RX ADMIN — Medication 81 MILLIGRAM(S): at 13:46

## 2021-10-29 RX ADMIN — ENOXAPARIN SODIUM 40 MILLIGRAM(S): 100 INJECTION SUBCUTANEOUS at 13:39

## 2021-10-29 RX ADMIN — PRASUGREL 10 MILLIGRAM(S): 5 TABLET, FILM COATED ORAL at 13:39

## 2021-10-29 NOTE — DISCHARGE NOTE NURSING/CASE MANAGEMENT/SOCIAL WORK - NSDCFUADDAPPT_GEN_ALL_CORE_FT
Nephrology progress note    Patient is seen and examined, events over the last 24 h noted.  No new complaints.    Allergies:  No Known Allergies    Hospital Medications:   MEDICATIONS  (STANDING):  aspirin  chewable 81 milliGRAM(s) Oral daily  atorvastatin 40 milliGRAM(s) Oral at bedtime  chlorhexidine 4% Liquid 1 Application(s) Topical <User Schedule>  clopidogrel Tablet 75 milliGRAM(s) Oral daily  furosemide   Injectable 40 milliGRAM(s) IV Push once  heparin  Infusion 1000 Unit(s)/Hr (10 mL/Hr) IV Continuous <Continuous>  metoprolol tartrate 50 milliGRAM(s) Oral two times a day  sodium bicarbonate 325 milliGRAM(s) Oral three times a day  timolol 0.5% Solution 1 Drop(s) Both EYES daily        VITALS:  T(F): 97.2 (18 @ 08:00), Max: 97.5 (18 @ 16:00)  HR: 70 (18 @ 10:00)  BP: 113/67 (18 @ 10:00)  RR: 22 (18 @ 10:00)  SpO2: 92% (18 @ 10:00)       @ 07:  -   @ 07:00  --------------------------------------------------------  IN: 2050 mL / OUT: 1340 mL / NET: 710 mL     @ 07:  -   @ 07:00  --------------------------------------------------------  IN: 1170 mL / OUT: 2105 mL / NET: -935 mL     @ 07:01  -   @ 12:27  --------------------------------------------------------  IN: 270 mL / OUT: 165 mL / NET: 105 mL          PHYSICAL EXAM:  Constitutional: NAD  Respiratory: CTAB, no wheezes, rales or rhonchi  Cardiovascular: S1, S2, RRR  Gastrointestinal: BS+, soft, NT/ND  Extremities: No peripheral edema  :  monaco in place      LABS:      136  |  96<L>  |  85<HH>  ----------------------------<  96  4.2   |  22  |  2.9<H>    Creatinine Trend: 2.9<--, 3.4<--, 3.6<--, 3.6<--, 3.5<--, 3.0<--    Ca    8.6      25 Dec 2018 05:00  Phos  4.9       Mg     2.5         TPro  5.9<L>  /  Alb  3.0<L>  /  TBili  0.6  /  DBili      /  AST  68<H>  /  ALT  208<H>  /  AlkPhos  90                            11.9   11.54 )-----------( 271      ( 25 Dec 2018 05:00 )             35.6       Urine Studies:  Urinalysis Basic - ( 23 Dec 2018 22:20 )    Color: Red / Appearance: Turbid / S.025 / pH:   Gluc:  / Ketone: Negative  / Bili: Small / Urobili: 1.0 mg/dL   Blood:  / Protein: 30 mg/dL / Nitrite: Negative   Leuk Esterase: Moderate / RBC: >50 /HPF / WBC 26-50 /HPF   Sq Epi:  / Non Sq Epi: Few /HPF / Bacteria: Many /HPF      Sodium, Random Urine: 23.0 mmoL/L ( @ 16:39)  Osmolality, Random Urine: 453 mos/kg ( @ 16:39)  Protein/Creatinine Ratio Calculation: 0.2 Ratio ( @ 16:39)  Creatinine, Random Urine: 114 mg/dL ( @ 16:39)    RADIOLOGY & ADDITIONAL STUDIES: Follow up with Dr. Mckee in one to two weeks    Follow up with Dr. Coleman 11/7/21 (prescheduled appointment)

## 2021-10-29 NOTE — H&P ADULT - NSHPPHYSICALEXAM_GEN_ALL_CORE
Vital Signs Last 24 Hrs  T(C): 37.4 (29 Oct 2021 08:13), Max: 37.4 (29 Oct 2021 08:13)  T(F): 99.3 (29 Oct 2021 08:13), Max: 99.3 (29 Oct 2021 08:13)  HR: 80 (29 Oct 2021 08:13) (80 - 80)  BP: 149/86 (29 Oct 2021 08:13) (149/86 - 149/86)  BP(mean): --  RR: 16 (29 Oct 2021 08:13) (16 - 16)  SpO2: 100% (29 Oct 2021 08:13) (100% - 100%)     General: NAD,    ENMT: no conjunctival injection, moist oral mucoses, good dentition   Neck and Lymph: no palpable masses in thyroids, no JVD, no lymphadenopathy   Lungs: good air entry b/l, no wheezing/rails/crackles on auscultation, no stridor  CVS: RRR, no M/R/G, no peripheral edema, palpable pedal pulses +2  Abdomen: soft, not distended  Extremities: no apparent deformities, preserved ROM  Skin: warm, dry, no rashes,  Neuro: OAX3, didn't noted CN abnormalities during my exam, observed moving all 4 ext against gravity and cooperating with physical exam, no apparent loss of sensation.   Pschyc; appropriate thought process, mood, response

## 2021-10-29 NOTE — CONSULT NOTE ADULT - SUBJECTIVE AND OBJECTIVE BOX
Patient is a 51y old  Male who presents with a chief complaint of CP      HPI:  51 y.o M with pmh of HTN, HLD, DM type 2, CAD s/p prior PCI ( JOE to LAD, RCA most recent in 07/2021) presents to ED with cp.   He was at his  base line till about a week ago when he noted L side sharp chest pain, w/o reported provoking or relieving factors, last for few minutes, mod in intensity, he denied decrease tolerance to physical activity, yesterday and this morning CP became more severe and appeared more frequently therefore called his cardiologist and advise to report to ED.  He thinks this is similar to his previous anginal pains.  He denied any n/v/d, abdominal pain, joint pain, rashes, syncopes leg pain, pnd, orthopnea palpitations. He takes meds faithfully.     PAST MEDICAL & SURGICAL HISTORY:  Coronary artery disease of native artery of native heart with stable angina pectoris    Type 2 diabetes mellitus with other circulatory complication, without long-term current use of insul    PAD (peripheral artery disease)    Essential hypertension    Pure hypercholesterolemia    S/P appendectomy    Status post insertion of drug-eluting stent into left anterior descending (LAD) artery for coronary  RADHA 4.00 X 12MM drug-eluting stent pLAD    Status post insertion of drug-eluting stent into left anterior descending (LAD) artery for coronary  3.0 X 15 [place] JOE pLAD, 2.5 X 38 [place]ha JOE mLAD    Presence of drug coated stent in left circumflex coronary artery  3.5 12 Radha JOE pLCX, 2.5 X 38 [place] JOE mLCX    Status post insertion of drug-eluting stent into right coronary artery for coronary artery disease  4.0 X 23 Xience JOE pRCA, 3.5 X 33 [place] JOE mRCA, 3.0 X 38 [place] JOE dRCA      Allergies    No Known Allergies    Intolerances        MEDICATIONS  (STANDING):  amLODIPine   Tablet 10 milliGRAM(s) Oral daily  aspirin enteric coated 81 milliGRAM(s) Oral daily  atorvastatin 20 milliGRAM(s) Oral at bedtime  dextrose 40% Gel 15 Gram(s) Oral once  dextrose 5%. 1000 milliLiter(s) (100 mL/Hr) IV Continuous <Continuous>  dextrose 5%. 1000 milliLiter(s) (50 mL/Hr) IV Continuous <Continuous>  dextrose 50% Injectable 25 Gram(s) IV Push once  dextrose 50% Injectable 12.5 Gram(s) IV Push once  dextrose 50% Injectable 25 Gram(s) IV Push once  enoxaparin Injectable 40 milliGRAM(s) SubCutaneous daily  glucagon  Injectable 1 milliGRAM(s) IntraMuscular once  insulin lispro (ADMELOG) corrective regimen sliding scale   SubCutaneous three times a day before meals  losartan 100 milliGRAM(s) Oral daily  prasugrel 10 milliGRAM(s) Oral daily    MEDICATIONS  (PRN):  acetaminophen     Tablet .. 650 milliGRAM(s) Oral every 6 hours PRN Temp greater or equal to 38C (100.4F), Moderate Pain (4 - 6)  nitroglycerin     SubLingual 0.4 milliGRAM(s) SubLingual every 5 minutes PRN Chest Pain    acetaminophen     Tablet .. 650 milliGRAM(s) Oral every 6 hours PRN  amLODIPine   Tablet 10 milliGRAM(s) Oral daily  aspirin enteric coated 81 milliGRAM(s) Oral daily  atorvastatin 20 milliGRAM(s) Oral at bedtime  dextrose 40% Gel 15 Gram(s) Oral once  dextrose 5%. 1000 milliLiter(s) IV Continuous <Continuous>  dextrose 5%. 1000 milliLiter(s) IV Continuous <Continuous>  dextrose 50% Injectable 25 Gram(s) IV Push once  dextrose 50% Injectable 12.5 Gram(s) IV Push once  dextrose 50% Injectable 25 Gram(s) IV Push once  enoxaparin Injectable 40 milliGRAM(s) SubCutaneous daily  glucagon  Injectable 1 milliGRAM(s) IntraMuscular once  insulin lispro (ADMELOG) corrective regimen sliding scale   SubCutaneous three times a day before meals  losartan 100 milliGRAM(s) Oral daily  nitroglycerin     SubLingual 0.4 milliGRAM(s) SubLingual every 5 minutes PRN  prasugrel 10 milliGRAM(s) Oral daily      FAMILY HISTORY:  FH: CAD (coronary artery disease) (Father, Sibling)    Family history of myocardial infarction (Father, Sibling)    Family history of coronary artery bypass surgery (Sibling)    Family history of diabetes mellitus (Mother)        SOCIAL HISTORY:    CIGARETTES:  None    ALCOHOL:  Rare    REVIEW OF SYSTEMS:  CONSTITUTIONAL: No fever, weight loss, or fatigue  EYES: No eye pain, visual disturbances, or discharge  ENMT:  No difficulty hearing, tinnitus, vertigo; No sinus or throat pain  NECK: No pain or stiffness  RESPIRATORY: No cough, wheezing, chills or hemoptysis; No Shortness of Breath  CARDIOVASCULAR: No palpitations, passing out, dizziness, or leg swelling  GASTROINTESTINAL: No abdominal or epigastric pain. No nausea, vomiting, or hematemesis; No diarrhea or constipation. No melena or hematochezia.  GENITOURINARY: No dysuria, frequency, hematuria, or incontinence  NEUROLOGICAL: No headaches, memory loss, loss of strength, numbness, or tremors  SKIN: No itching, burning, rashes, or lesions   LYMPH Nodes: No enlarged glands  ENDOCRINE: No heat or cold intolerance; No hair loss  MUSCULOSKELETAL: No joint pain or swelling; No muscle, back, or extremity pain  PSYCHIATRIC: No depression, anxiety, mood swings, or difficulty sleeping  HEME/LYMPH: No easy bruising, or bleeding gums  ALLERY AND IMMUNOLOGIC: No hives or eczema	    Vital Signs Last 24 Hrs  T(C): 36.7 (29 Oct 2021 14:20), Max: 37.4 (29 Oct 2021 08:13)  T(F): 98 (29 Oct 2021 14:20), Max: 99.3 (29 Oct 2021 08:13)  HR: 76 (29 Oct 2021 14:20) (76 - 80)  BP: 151/85 (29 Oct 2021 14:20) (149/86 - 151/85)  BP(mean): --  RR: 16 (29 Oct 2021 14:20) (16 - 16)  SpO2: 98% (29 Oct 2021 14:20) (98% - 100%)    Daily Height in cm: 170.18 (29 Oct 2021 08:13)    Daily     I&O's Detail      PHYSICAL EXAM:  Appearance: Normal, well nourished	  HEENT:   Normal oral mucosa, PERRL, EOMI, sclera non-icteric	  Lymphatic: No cervical lymphadenopathy  Cardiovascular: Normal S1 S2, No JVD, No cardiac murmurs, No carotid bruits, No peripheral edema  Respiratory: Lungs clear to auscultation	  Psychiatry: A & O x 3, Mood & affect appropriate  Gastrointestinal:  Soft, Non-tender, + BS, no bruits	  Skin: No rashes, No ecchymoses, No cyanosis  Neurologic: Grossly non-focal with full strength in all four extremities  Extremities: Normal range of motion, No clubbing, cyanosis or edema  Vascular: Peripheral pulses palpable 2+ bilaterally    LABS:                        13.9   7.98  )-----------( 346      ( 29 Oct 2021 10:00 )             39.8     10-29    136  |  96<L>  |  7.0<L>  ----------------------------<  155<H>  3.5   |  27.0  |  0.77    Ca    9.4      29 Oct 2021 10:00    TPro  8.2  /  Alb  4.3  /  TBili  0.5  /  DBili  x   /  AST  18  /  ALT  29  /  AlkPhos  81  10-29    CARDIAC MARKERS ( 29 Oct 2021 10:00 )  x     / <0.01 ng/mL / x     / x     / x          PT/INR - ( 29 Oct 2021 10:00 )   PT: 12.9 sec;   INR: 1.12 ratio         PTT - ( 29 Oct 2021 10:00 )  PTT:33.2 sec    I&O's Summary    BNP  RADIOLOGY & ADDITIONAL STUDIES:    Assessment:  51 y.o M with pmh of HTN, HLD, DM type 2, CAD s/p prior PCI ( JOE to LAD, RCA most recent in 07/2021) presents to ED with cp.   He was at his  base line till about a week ago when he noted L side sharp chest pain, w/o reported provoking or relieving factors, last for few minutes, mod in intensity, he denied decrease tolerance to physical activity, yesterday and this morning CP became more severe and appeared more frequently therefore called his cardiologist and advise to report to ED.  He thinks this is similar to his previous anginal pains.  He denied any n/v/d, abdominal pain, joint pain, rashes, syncopes leg pain, pnd, orthopnea palpitations. He takes meds faithfully.     Plan:  Cardiac catheterization and possible percutaneous intervention recommended.  Risks, benefits, and alternatives reviewed.  Risks including but not limited to MI, death, stroke, bleeding, infection, vessel injury, hematoma, renal failure, allergic reaction, urgent open heart surgery, restenosis and stent thrombosis were reviewed.  All questions answered.  Patient is agreeable to proceed.

## 2021-10-29 NOTE — DISCHARGE NOTE PROVIDER - HOSPITAL COURSE
Mr. Pruett is 51 y.o M with pmh of HTN, HLD, DM type 2, CAD s/p prior PCI ( JOE to LAD, RCA most recent in 07/2021) presents to ED with cp.  Apparently he was at his  baseline till about a week ago when he noted L side sharp chest pain, w/o reported provoking or relieving factors, last for few minutes, mod in intensity, he denied decrease tolerance to physical activity, yesterday and this morning CP became more severe and appeared more frequently therefore called his cardiologist and was advised to report to ED. He denied any n/v/d, abdominal pain, joint pain, rashes, syncopes leg pain, pnd, orthopnea palpitations. He takes meds faithfully.  EKG with ischemic changes.  He presents for [facility] for further evaluation with Dr. Mckee.  Now s/p LHC via RRA that revealed patent stents with nonobstructive CAD requiring no intervention.  -Remove radial band one hour post procedure   -Bedrest x 1 hour post procedure then OOB  -If stable, may discharge to home 2 hours post procedure at 1900  -Continue current med regimen  -Add Protonix daily for possible GERD symptoms  -Follow up with PMD next week for further evaluation  -Follow up with Dr. Mckee in one to two weeks  -Activity instructions discussed with patient verbal understanding  -Discussed with Dr. Vásquez and Dr. Mckee

## 2021-10-29 NOTE — ED PROVIDER NOTE - OBJECTIVE STATEMENT
51 year old male with PMG CAD s/p 9 stents, HTN, HLD, DM presents with chets pain. pt reports that he has been having chest pressure for the past 9 days. The pain is intermittent, no radiatton, but is associated with exertional dyspnea. No diaphoresis, nausea, lightheadedness.

## 2021-10-29 NOTE — H&P ADULT - NSICDXPASTSURGICALHX_GEN_ALL_CORE_FT
PAST SURGICAL HISTORY:  Presence of drug coated stent in left circumflex coronary artery 3.5 12 Radha JOE pLCX, 2.5 X 38 Enochs JOE mLCX    S/P appendectomy     Status post insertion of drug-eluting stent into left anterior descending (LAD) artery for coronary RADHA 4.00 X 12MM drug-eluting stent pLAD    Status post insertion of drug-eluting stent into left anterior descending (LAD) artery for coronary 3.0 X 15 Radha JOE pLAD, 2.5 X 38 [place] JOE mLAD    Status post insertion of drug-eluting stent into right coronary artery for coronary artery disease 4.0 X 23 Xience JOE pRCA, 3.5 X 33 Xience JOE mRCA, 3.0 X 38 Xience JOE dRCA

## 2021-10-29 NOTE — PROGRESS NOTE ADULT - SUBJECTIVE AND OBJECTIVE BOX
Cardiology NP preprocedure note:     -For [facility] with Dr. Mckee    TELE: NSR 70s    MEDICATIONS  (STANDING):  amLODIPine   Tablet 10 milliGRAM(s) Oral daily  aspirin enteric coated 81 milliGRAM(s) Oral daily  atorvastatin 20 milliGRAM(s) Oral at bedtime  dextrose 40% Gel 15 Gram(s) Oral once  dextrose 5%. 1000 milliLiter(s) (100 mL/Hr) IV Continuous <Continuous>  dextrose 5%. 1000 milliLiter(s) (50 mL/Hr) IV Continuous <Continuous>  dextrose 50% Injectable 25 Gram(s) IV Push once  dextrose 50% Injectable 12.5 Gram(s) IV Push once  dextrose 50% Injectable 25 Gram(s) IV Push once  enoxaparin Injectable 40 milliGRAM(s) SubCutaneous daily  glucagon  Injectable 1 milliGRAM(s) IntraMuscular once  insulin lispro (ADMELOG) corrective regimen sliding scale   SubCutaneous three times a day before meals  losartan 100 milliGRAM(s) Oral daily  prasugrel 10 milliGRAM(s) Oral daily    MEDICATIONS  (PRN):  acetaminophen     Tablet .. 650 milliGRAM(s) Oral every 6 hours PRN Temp greater or equal to 38C (100.4F), Moderate Pain (4 - 6)  nitroglycerin     SubLingual 0.4 milliGRAM(s) SubLingual every 5 minutes PRN Chest Pain      Allergies:  No Known Allergies      PAST MEDICAL & SURGICAL HISTORY:  Coronary artery disease of native artery of native heart with stable angina pectoris    Type 2 diabetes mellitus with other circulatory complication, without long-term current use of insul    PAD (peripheral artery disease)    Essential hypertension    Pure hypercholesterolemia    S/P appendectomy    Status post insertion of drug-eluting stent into left anterior descending (LAD) artery for coronary  RADHA 4.00 X 12MM drug-eluting stent pLAD    Status post insertion of drug-eluting stent into left anterior descending (LAD) artery for coronary  3.0 X 15 Radha JOE pLAD, 2.5 X 38 [place] JOE mLAD    Presence of drug coated stent in left circumflex coronary artery  3.5 12 [place] JOE pLCX, 2.5 X 38 Radha JOE mLCX    Status post insertion of drug-eluting stent into right coronary artery for coronary artery disease  4.0 X 23 Xience JOE pRCA, 3.5 X 33 Xience JOE mRCA, 3.0 X 38 Xience JOE dRCA        Vital Signs Last 24 Hrs  T(C): 36.7 (29 Oct 2021 14:20), Max: 37.4 (29 Oct 2021 08:13)  T(F): 98 (29 Oct 2021 14:20), Max: 99.3 (29 Oct 2021 08:13)  HR: 76 (29 Oct 2021 14:20) (76 - 80)  BP: 151/85 (29 Oct 2021 14:20) (149/86 - 151/85)  BP(mean): --  RR: 16 (29 Oct 2021 14:20) (16 - 16)  SpO2: 98% (29 Oct 2021 14:20) (98% - 100%)    Physical Exam:  Constitutional: NAD, AAOx3  Cardiovascular: +S1S2 RRR  Pulmonary: CTA b/l, unlabored  GI: soft NTND +BS  Extremities: no pedal edema, +distal pulses b/l  Neuro: non focal, BLOCK x4  ASA 2  Mallampati 2  BRA 0.5%      LABS:                        13.9   7.98  )-----------( 346      ( 29 Oct 2021 10:00 )             39.8     10-29    136  |  96<L>  |  7.0<L>  ----------------------------<  155<H>  3.5   |  27.0  |  0.77    Ca    9.4      29 Oct 2021 10:00    TPro  8.2  /  Alb  4.3  /  TBili  0.5  /  DBili  x   /  AST  18  /  ALT  29  /  AlkPhos  81  10-29    PT/INR - ( 29 Oct 2021 10:00 )   PT: 12.9 sec;   INR: 1.12 ratio         PTT - ( 29 Oct 2021 10:00 )  PTT:33.2 sec      RADIOLOGY & ADDITIONAL TESTS:

## 2021-10-29 NOTE — DISCHARGE NOTE PROVIDER - NSDCFUADDAPPT_GEN_ALL_CORE_FT
Follow up with Dr. Mckee in one to two weeks    Follow up with Dr. Coleman 11/7/21 (prescheduled appointment)

## 2021-10-29 NOTE — H&P ADULT - NSHPLABSRESULTS_GEN_ALL_CORE
LABS:                        13.9   7.98  )-----------( 346      ( 29 Oct 2021 10:00 )             39.8     10-29    136  |  96<L>  |  7.0<L>  ----------------------------<  155<H>  3.5   |  27.0  |  0.77    Ca    9.4      29 Oct 2021 10:00    TPro  8.2  /  Alb  4.3  /  TBili  0.5  /  DBili  x   /  AST  18  /  ALT  29  /  AlkPhos  81  10-29    PT/INR - ( 29 Oct 2021 10:00 )   PT: 12.9 sec;   INR: 1.12 ratio         PTT - ( 29 Oct 2021 10:00 )  PTT:33.2 sec  CARDIAC MARKERS ( 29 Oct 2021 10:00 )  x     / <0.01 ng/mL / x     / x     / x              CAPILLARY BLOOD GLUCOSE            RADIOLOGY & ADDITIONAL TESTS:  Results Reviewed:   Imaging Personally Reviewed: CXR  Electrocardiogram Personally Reviewed: was not able to review ECG myself at the moment.

## 2021-10-29 NOTE — PROGRESS NOTE ADULT - SUBJECTIVE AND OBJECTIVE BOX
Cardiology NP post procedure note:     -s/p LHC via RRA with Dr. Mckee: Patent stents + nonobstructive CAD    TELE: NSR 70s    MEDICATIONS  (STANDING):  amLODIPine   Tablet 10 milliGRAM(s) Oral daily  aspirin enteric coated 81 milliGRAM(s) Oral daily  atorvastatin 20 milliGRAM(s) Oral at bedtime  dextrose 40% Gel 15 Gram(s) Oral once  dextrose 5%. 1000 milliLiter(s) (100 mL/Hr) IV Continuous <Continuous>  dextrose 5%. 1000 milliLiter(s) (50 mL/Hr) IV Continuous <Continuous>  dextrose 50% Injectable 25 Gram(s) IV Push once  dextrose 50% Injectable 12.5 Gram(s) IV Push once  dextrose 50% Injectable 25 Gram(s) IV Push once  enoxaparin Injectable 40 milliGRAM(s) SubCutaneous daily  glucagon  Injectable 1 milliGRAM(s) IntraMuscular once  insulin lispro (ADMELOG) corrective regimen sliding scale   SubCutaneous three times a day before meals  losartan 100 milliGRAM(s) Oral daily  pantoprazole    Tablet 40 milliGRAM(s) Oral before breakfast  prasugrel 10 milliGRAM(s) Oral daily    MEDICATIONS  (PRN):  acetaminophen     Tablet .. 650 milliGRAM(s) Oral every 6 hours PRN Temp greater or equal to 38C (100.4F), Moderate Pain (4 - 6)  nitroglycerin     SubLingual 0.4 milliGRAM(s) SubLingual every 5 minutes PRN Chest Pain      Allergies:  No Known Allergies      PAST MEDICAL & SURGICAL HISTORY:  Coronary artery disease of native artery of native heart with stable angina pectoris    Type 2 diabetes mellitus with other circulatory complication, without long-term current use of insul    PAD (peripheral artery disease)    Essential hypertension    Pure hypercholesterolemia    S/P appendectomy    Status post insertion of drug-eluting stent into left anterior descending (LAD) artery for coronary  RADHA 4.00 X 12MM drug-eluting stent pLAD    Status post insertion of drug-eluting stent into left anterior descending (LAD) artery for coronary  3.0 X 15 [place] JOE pLAD, 2.5 X 38 Radha JOE mLAD    Presence of drug coated stent in left circumflex coronary artery  3.5 12 [place] JOE pLCX, 2.5 X 38 [place] JOE mLCX    Status post insertion of drug-eluting stent into right coronary artery for coronary artery disease  4.0 X 23 Xience JOE pRCA, 3.5 X 33 Xience JOE mRCA, 3.0 X 38 Xience JOE dRCA        Vital Signs Last 24 Hrs  T(C): 36.7 (29 Oct 2021 14:20), Max: 37.4 (29 Oct 2021 08:13)  T(F): 98 (29 Oct 2021 14:20), Max: 99.3 (29 Oct 2021 08:13)  HR: 74 (29 Oct 2021 17:00) (74 - 80)  BP: 131/80 (29 Oct 2021 17:00) (131/80 - 151/85)  BP(mean): --  RR: 14 (29 Oct 2021 17:00) (14 - 16)  SpO2: 96% (29 Oct 2021 17:00) (96% - 100%)    Physical Exam:  Constitutional: NAD, AAOx3  Cardiovascular: +S1S2 RRR  Pulmonary: CTA b/l, unlabored  GI: soft NTND +BS  Extremities: no pedal edema, +distal pulses b/l  Neuro: non focal, BLOCK x4  Procedure site: Right Radial band in place; site benign without hematoma/bleeding; RUE warm/mobile/acyanotic; + right ulnar pulse    LABS:                        13.9   7.98  )-----------( 346      ( 29 Oct 2021 10:00 )             39.8     10-    136  |  96<L>  |  7.0<L>  ----------------------------<  155<H>  3.5   |  27.0  |  0.77    Ca    9.4      29 Oct 2021 10:00    TPro  8.2  /  Alb  4.3  /  TBili  0.5  /  DBili  x   /  AST  18  /  ALT  29  /  AlkPhos  81  10-29    PT/INR - ( 29 Oct 2021 10:00 )   PT: 12.9 sec;   INR: 1.12 ratio         PTT - ( 29 Oct 2021 10:00 )  PTT:33.2 sec      RADIOLOGY & ADDITIONAL TESTS:  < from: Cardiac Catheterization (10.29.21 @ 16:24) >  [facility]  Department of Cardiology  [address] [place] [id number]  [phone]  Cath Lab Report -- Comprehensive Report  Patient: DEANN SAMPSON  Study date: 10/29/2021  Account number: [id number]  MR number: [id number]  : 1970  Gender: Male  Race: O  Case Physician(s):  Cameron Mckee MD  Referring Physician:  Cameron Mckee MD  INDICATIONS: Unstable angina - CCS3.  HISTORY: The patient has a history of coronary artery disease. The patient  has hypertension, oral hypoglycemic-treated diabetes, and  medication-treated dyslipidemia.  PROCEDURE:  --  Left heart catheterization with ventriculography.  --  Left coronary angiography.  --  Right coronary angiography.  TECHNIQUE: The risks and alternatives of the procedures and conscious  sedation were explained to the patient and informed consent was obtained.  Cardiac catheterization performed electively.  Local anesthetic given. Right radial artery access. Left heart  catheterization. Ventriculography was performed. Left coronary artery  angiography. The vessel was injected utilizing a catheter. Right coronary  artery angiography. The vessel was injected utilizing a catheter.  RADIATION EXPOSURE: 3.2 min.  CONTRAST GIVEN: Omnipaque 57ml.  MEDICATIONS GIVEN: Midazolam, 2 mg, IV. Fentanyl, 50 mcg, IV. Verapamil  (Isoptin, Calan, Covera), 5 mg, IA. Heparin, 4000 units, IA. 1% Lidocaine,  5 ml, subcutaneously.  VENTRICLES: There were no left ventricular global or regional wall motion  abnormalities. Global left ventricular function was normal. EF estimated  was 55 %.  VALVES: MITRAL VALVE: The mitral valve exhibited trivial regurgitation  (less than 1+).  CORONARY VESSELS: The coronary circulation is right dominant.  LM:   --  Mid left main: There was a tubular 10 % stenosis.  LAD:   --  Proximal LAD: There was a tubular 0 % stenosis at the site of a  prior stent.  --  Mid LAD: There was a diffuse 25 % stenosis at the site of a prior  stent.  CX:   --  Ostial circumflex: There was a tubular 20 % stenosis.  --  Proximal circumflex: There was a diffuse 0 % stenosis at the site of a  prior stent.  --  Mid circumflex: There was a tubular 0 % stenosis at the site of a prior  stent.  RCA:   --  Proximal RCA: There was a sigfxmf89 % stenosis at the site of a  prior stent.  --  Mid RCA: There was a tubular 10 % stenosis at the site of a prior  stent.  --  Distal RCA: There was a discrete 20 % stenosis at the site of a prior  stent.  --  RPDA: There was a discrete 40 % stenosis at the ostium of the vessel  segment. In a second lesion, there was a tubular 0 % stenosis at the site  of a prior stent, in the proximal third of the vessel segment.  COMPLICATIONS: No complications occurred during the cath lab visit.  DIAGNOSTIC RECOMMENDATIONS: The patient should continue with the present  medications.  Prepared and signed by  Cameron Mckee MD  Signed 10/29/2021 17:22:07  HEMODYNAMIC TABLES  Pressures:  Baseline  Pressures:  - HR: 78  Pressures:  - Rhythm:  Pressures:  --Aortic Pressure (S/D/M): 140/75/64  Pressures:  -- Left Ventricle (s/edp): 130/8/--  Outputs:  Baseline  Outputs:  -- CALCULATIONS[age]  Outputs:  -- CALCULATIONS: Body Surface Area: 2.02  Outputs:  -- CALCULATIONS: Height in cm: 170.00  Outputs:  -- CALCULATIONS: Sex: Male  Outputs:  -- CALCULATIONS: Weight in k.30  Outputs:  -- OUTPUTS: O2 consumption: 252.07  Outputs:  -- OUTPUTS: Vo2 I    < end of copied text >

## 2021-10-29 NOTE — DISCHARGE NOTE PROVIDER - PROVIDER TOKENS
PROVIDER:[TOKEN:[[id number]],FOLLOWUP:[2 weeks],ESTABLISHEDPATIENT:[T]],PROVIDER:[TOKEN:[[id number]],FOLLOWUP:[1 week],ESTABLISHEDPATIENT:[T]]

## 2021-10-29 NOTE — ED ADULT TRIAGE NOTE - CHIEF COMPLAINT QUOTE
Pt states non-radiating chest pain x2days and saw PMD yesterday and was told to come to ER. Pt denies any other symptoms.

## 2021-10-29 NOTE — ED PROVIDER NOTE - NSICDXPASTSURGICALHX_GEN_ALL_CORE_FT
PAST SURGICAL HISTORY:  Presence of drug coated stent in left circumflex coronary artery 3.5 12 Radha JOE pLCX, 2.5 X 38 [place] JOE mLCX    S/P appendectomy     Status post insertion of drug-eluting stent into left anterior descending (LAD) artery for coronary RADHA 4.00 X 12MM drug-eluting stent pLAD    Status post insertion of drug-eluting stent into left anterior descending (LAD) artery for coronary 3.0 X 15 Radha JEO pLAD, 2.5 X 38 [place] JOE mLAD    Status post insertion of drug-eluting stent into right coronary artery for coronary artery disease 4.0 X 23 Xience JOE pRCA, 3.5 X 33 Xience JOE mRCA, 3.0 X 38 Xience JOE dRCA

## 2021-10-29 NOTE — H&P ADULT - PROBLEM SELECTOR PLAN 3
- resumed amlodipin and hctz in am PAST SURGICAL HISTORY:  No significant past surgical history

## 2021-10-29 NOTE — DISCHARGE NOTE PROVIDER - CARE PROVIDER_API CALL
Cameron Mckee)  Cardiovascular Disease; Interventional Cardiology  [address] [place] [address]  Phone: [phone]  Fax: [phone]  Established Patient  Follow Up Time: 2 weeks    [place] [address]  Phone: [phone]  Fax: [phone]  Established Patient  Follow Up Time: 1 week

## 2021-10-29 NOTE — DISCHARGE NOTE PROVIDER - NSDCMRMEDTOKEN_GEN_ALL_CORE_FT
amLODIPine 10 mg oral tablet: 1 tab(s) orally once a day  Aspir 81 oral delayed release tablet: 1 tab(s) orally once a day  atorvastatin 20 mg oral tablet: 1 tab(s) orally once a day  glipiZIDE 10 mg oral tablet: 1 tab(s) orally 2 times a day  hydroCHLOROthiazide 12.5 mg oral tablet: 1 tab(s) orally once a day  losartan 100 mg oral tablet: 1 tab(s) orally once a day  metFORMIN 1000 mg oral tablet: 1 tab(s) orally 2 times a day  Hold x 48 hours then you can resume it 11/1/21  Ozempic 2 mg/1.5 mL (0.25 mg or 0.5 mg dose) subcutaneous solution: 1 dose(s) subcutaneous once a week  pantoprazole 40 mg oral delayed release tablet: 1 tab(s) orally once a day (before a meal)  prasugrel 10 mg oral tablet: 1 tab(s) orally once a day

## 2021-10-29 NOTE — H&P ADULT - PROBLEM SELECTOR PLAN 1
- atypical on discription but in pt with hx of dm and prior multiple stents  - chest pain free at the moment  - admitted to tele bed  - nitro prn q5min for CP  - NPO for LHC later today  - will defer decision for repeat ECHO to cardiology team  - will check a1c, tsh, lipid panel  -  c/w prasugrel, statins, ace,

## 2021-10-29 NOTE — H&P ADULT - HISTORY OF PRESENT ILLNESS
Ms. Pruett i s51 y.o M with pmh of HTN, HLD, DM type 2, CAD s/p prior PCI ( JOE to LAD, RCA most recent in 03/2021) presents to ED with cp.   Hpi obrtained from pt, apparently he was at his  base line till about a week ago when he noted L side sharp chest pain, w/o reported provoking or relieving factors, last for few minutes, mod in intensity, he denied decrease tolerance to physical activity, yesterday and this morning CP became more sever and appeared more frequently therefore called his cardiologist and advise to report to ED. He denied any n/v/d, abdominal pain, joint pain, rashes, syncopes leg pain, pnd, orthopnea palpitations. He takes meds faithfully.   In ED he is vitally stable with normal CBC, BMP, INR, CXR, ecg w/o apparent ischemic changes as per ED provider report. He was seen by cardiology team and recommended for LHC.

## 2021-10-29 NOTE — PROGRESS NOTE ADULT - ASSESSMENT
A/P: Ms. Pruett i s51 y.o M with pmh of HTN, HLD, DM type 2, CAD s/p prior PCI ( JOE to LAD, RCA most recent in 07/2021) presents to ED with cp.  Apparently he was at his  baseline till about a week ago when he noted L side sharp chest pain, w/o reported provoking or relieving factors, last for few minutes, mod in intensity, he denied decrease tolerance to physical activity, yesterday and this morning CP became more severe and appeared more frequently therefore called his cardiologist and was advised to report to ED. He denied any n/v/d, abdominal pain, joint pain, rashes, syncopes leg pain, pnd, orthopnea palpitations. He takes meds faithfully.  EKG with ischemic changes.  He presents for [facility] for further evaluation with Dr. Mckee.    -NPO for procedure  -Consent obtained by MD
A/P: Mr. Pruett is 51 y.o M with pmh of HTN, HLD, DM type 2, CAD s/p prior PCI ( JOE to LAD, RCA most recent in 07/2021) presents to ED with cp.  Apparently he was at his  baseline till about a week ago when he noted L side sharp chest pain, w/o reported provoking or relieving factors, last for few minutes, mod in intensity, he denied decrease tolerance to physical activity, yesterday and this morning CP became more severe and appeared more frequently therefore called his cardiologist and was advised to report to ED. He denied any n/v/d, abdominal pain, joint pain, rashes, syncopes leg pain, pnd, orthopnea palpitations. He takes meds faithfully.  EKG with ischemic changes.  He presents for [facility] for further evaluation with Dr. Mckee.  Now s/p LHC via RRA that revealed patent stents with nonobstructive CAD requiring no intervention.  -Remove radial band one hour post procedure   -Bedrest x 1 hour post procedure then OOB  -If stable, may discharge to home 2 hours post procedure at 1900  -Continue current med regimen  -Add Protonix daily for possible GERD symptoms  -Follow up with PMD next week for further evaluation  -Follow up with Dr. Mckee in one to two weeks  -Activity instructions discussed with patient verbal understanding  -Discussed with Dr. Vásquez and Dr. Mckee

## 2021-10-29 NOTE — DISCHARGE NOTE PROVIDER - NSDCCPCAREPLAN_GEN_ALL_CORE_FT
PRINCIPAL DISCHARGE DIAGNOSIS  Diagnosis: Nonobstructive atherosclerosis of coronary artery  Assessment and Plan of Treatment:       SECONDARY DISCHARGE DIAGNOSES  Diagnosis: GERD (gastroesophageal reflux disease)  Assessment and Plan of Treatment: Add Protonix daily

## 2022-04-25 VITALS
RESPIRATION RATE: 20 BRPM | SYSTOLIC BLOOD PRESSURE: 156 MMHG | HEIGHT: 67 IN | DIASTOLIC BLOOD PRESSURE: 90 MMHG | OXYGEN SATURATION: 99 % | HEART RATE: 78 BPM

## 2022-04-25 RX ORDER — CHLORHEXIDINE GLUCONATE 213 G/1000ML
1 SOLUTION TOPICAL ONCE
Refills: 0 | Status: DISCONTINUED | OUTPATIENT
Start: 2022-04-26 | End: 2022-05-10

## 2022-04-25 NOTE — H&P PST ADULT - LAST CARDIAC ANGIOGRAM/IMAGING
< from: Cardiac Catheterization (10.29.21 @ 16:24) >VENTRICLES: There were no left ventricular global or regional wall motion

## 2022-04-25 NOTE — H&P PST ADULT - HISTORY OF PRESENT ILLNESS
This is a 52 y/o male with h/o CAD s/p PCI to mid and proximal LAD, mid and proximal LCX, mid/proximal/distal RCA and distal RCA (ISR) and RPDA, Diabetes, HPL, HTN, Obesity, and known significant right SFA disease.  He is having progressive RLE claudication.  He presents today for peripheral angiogram and angioplasty with Dr. Mckee.    ASA  Mallampati   GFR  Creat  BRA  COVID negative      Risk Factors for PAD       Age: 52 y/o       DM: YES       Smoking History: Former smoker       Hyperlipidemia: YES       Hypertension: YES        Family History of PAD: N/A       Known Atherosclerotic Disease (coronary, carotid, subclavian, renal, mesenteric, AAA): coronary artery disease    Subjective Complaints:        Claudication Dax Class:        Impaired Walking Function:        Ischemic Rest Pain:        Other Non-Joint Related Exertional Lower Extremity Symptoms (not typical of claudication):     Objective Findings:        Lower Extremity Pulses:        Nonhealing Lower Extremity Wound:        Lower Extremity Gangrene:        Vascular Bruit:        Other Suggestive Lower Extremity Findings (elevation pallor, dependent rubor):     Vascular Testing:        CARRIE (Normal/Borderline/Abnormal/Noncompressable):        Arterial Dopplers:        CTA/MRA:     Medical Management:       Antiplatelets: ASA/Effient       Statin: lipitor        Dax Claudication Classification:        I: Asymptomatic       IIa: Walking > 200 meters (2.5 blocks)       IIb: Walking < 200 meters (2.5 blocks)       III: Rest/nocturnal pain       IV: Necrosis/gangrene    Ankle-Brachial Index:       Noncompressable: > 1.4       Normal: 1.0 to 1.4       Borderline: 0.91 to 0.99       Abnormal: < 0.91   This is a 52 y/o male with h/o CAD s/p PCI to mid and proximal LAD, mid and proximal LCX, mid/proximal/distal RCA and distal RCA (ISR) and RPDA, Diabetes, HPL, HTN, Obesity, and known significant right SFA disease.  He is having progressive RLE claudication.  He presents today for peripheral angiogram and angioplasty with Dr. Mckee.    ASA-3  Mallampati -2  GFR  Creat ( pending)  BRA ( pending)  COVID negative      Risk Factors for PAD       Age: 52 y/o       DM: YES       Smoking History: Former smoker       Hyperlipidemia: YES       Hypertension: YES        Family History of PAD: N/A       Known Atherosclerotic Disease (coronary, carotid, subclavian, renal, mesenteric, AAA): coronary artery disease    Subjective Complaints:        Claudication Dax Class: III       Impaired Walking Function: states he can walk 2 blocks-RLE pain at rest       Ischemic Rest Pain: RLE Yes       Other Non-Joint Related Exertional Lower Extremity Symptoms (not typical of claudication):     Objective Findings:        Lower Extremity Pulses:        Nonhealing Lower Extremity Wound:        Lower Extremity Gangrene:        Vascular Bruit:        Other Suggestive Lower Extremity Findings (elevation pallor, dependent rubor):     Vascular Testing:        CARRIE (Normal/Borderline/Abnormal/Noncompressable):        Arterial Dopplers:        CTA/MRA:     Medical Management:       Antiplatelets: ASA/Effient       Statin: lipitor        Dax Claudication Classification:        I: Asymptomatic       IIa: Walking > 200 meters (2.5 blocks)       IIb: Walking < 200 meters (2.5 blocks)       III: Rest/nocturnal pain       IV: Necrosis/gangrene    Ankle-Brachial Index:       Noncompressable: > 1.4       Normal: 1.0 to 1.4       Borderline: 0.91 to 0.99       Abnormal: < 0.91   This is a 52 y/o male with h/o CAD s/p PCI to mid and proximal LAD, mid and proximal LCX, mid/proximal/distal RCA and distal RCA (ISR) and RPDA, Diabetes, HPL, HTN, Obesity, and known significant right SFA disease.  He is having progressive RLE claudication.  He presents today for peripheral angiogram and angioplasty with Dr. Mckee.    ASA-3  Mallampati -2  GFR-108  Creat-0.78  BRA -0.6%  COVID negative      Risk Factors for PAD       Age: 52 y/o       DM: YES       Smoking History: Former smoker       Hyperlipidemia: YES       Hypertension: YES        Family History of PAD: N/A       Known Atherosclerotic Disease (coronary, carotid, subclavian, renal, mesenteric, AAA): coronary artery disease    Subjective Complaints:        Claudication Dax Class: III       Impaired Walking Function: states he can walk 2 blocks-RLE pain at rest       Ischemic Rest Pain: RLE Yes       Other Non-Joint Related Exertional Lower Extremity Symptoms (not typical of claudication):     Objective Findings:        Lower Extremity Pulses:        Nonhealing Lower Extremity Wound:        Lower Extremity Gangrene:        Vascular Bruit:        Other Suggestive Lower Extremity Findings (elevation pallor, dependent rubor):     Vascular Testing:        CARRIE (Normal/Borderline/Abnormal/Noncompressable):        Arterial Dopplers:        CTA/MRA:     Medical Management:       Antiplatelets: ASA/Effient       Statin: lipitor        Dax Claudication Classification:        I: Asymptomatic       IIa: Walking > 200 meters (2.5 blocks)       IIb: Walking < 200 meters (2.5 blocks)       III: Rest/nocturnal pain       IV: Necrosis/gangrene    Ankle-Brachial Index:       Noncompressable: > 1.4       Normal: 1.0 to 1.4       Borderline: 0.91 to 0.99       Abnormal: < 0.91

## 2022-04-25 NOTE — H&P PST ADULT - NSICDXPASTSURGICALHX_GEN_ALL_CORE_FT
PAST SURGICAL HISTORY:  Presence of drug coated stent in left circumflex coronary artery 3.5 12 [place] JOE pLCX, 2.5 X 38 [place] JOE mLCX    S/P appendectomy     Status post insertion of drug-eluting stent into left anterior descending (LAD) artery for coronary RADHA 4.00 X 12MM drug-eluting stent pLAD    Status post insertion of drug-eluting stent into left anterior descending (LAD) artery for coronary 3.0 X 15 [place] JOE pLAD, 2.5 X 38 [place] JOE mLAD    Status post insertion of drug-eluting stent into right coronary artery for coronary artery disease 4.0 X 23 [place] JOE pRCA, 3.5 X 33 [place] JOE mRCA, 3.0 X 38 [place] JOE dRCA

## 2022-04-25 NOTE — H&P PST ADULT - ASSESSMENT
Known Right SFA occlusive PAD with progressive RLE claudication for elective peripheral angiogram and Angioplasty    -NPO for procedure  -Consent to be obtained by IC  -IVF hydration preprocedure to prevent DALIA as per protocol

## 2022-04-26 ENCOUNTER — OUTPATIENT (OUTPATIENT)
Dept: OUTPATIENT SERVICES | Facility: HOSPITAL | Age: 52
LOS: 1 days | Discharge: HOME | End: 2022-04-26
Payer: COMMERCIAL

## 2022-04-26 ENCOUNTER — TRANSCRIPTION ENCOUNTER (OUTPATIENT)
Age: 52
End: 2022-04-26

## 2022-04-26 VITALS
SYSTOLIC BLOOD PRESSURE: 154 MMHG | RESPIRATION RATE: 16 BRPM | OXYGEN SATURATION: 98 % | DIASTOLIC BLOOD PRESSURE: 97 MMHG | HEART RATE: 84 BPM

## 2022-04-26 DIAGNOSIS — Z90.49 ACQUIRED ABSENCE OF OTHER SPECIFIED PARTS OF DIGESTIVE TRACT: Chronic | ICD-10-CM

## 2022-04-26 DIAGNOSIS — Z95.5 PRESENCE OF CORONARY ANGIOPLASTY IMPLANT AND GRAFT: Chronic | ICD-10-CM

## 2022-04-26 DIAGNOSIS — R07.9 CHEST PAIN, UNSPECIFIED: ICD-10-CM

## 2022-04-26 LAB
A1C WITH ESTIMATED AVERAGE GLUCOSE RESULT: 7.6 % — HIGH (ref 4–5.6)
ALBUMIN SERPL ELPH-MCNC: 4.4 G/DL — SIGNIFICANT CHANGE UP (ref 3.3–5.2)
ALP SERPL-CCNC: 87 U/L — SIGNIFICANT CHANGE UP (ref 40–120)
ALT FLD-CCNC: 32 U/L — SIGNIFICANT CHANGE UP
ANION GAP SERPL CALC-SCNC: 15 MMOL/L — SIGNIFICANT CHANGE UP (ref 5–17)
AST SERPL-CCNC: 22 U/L — SIGNIFICANT CHANGE UP
BASOPHILS # BLD AUTO: 0.04 K/UL — SIGNIFICANT CHANGE UP (ref 0–0.2)
BASOPHILS NFR BLD AUTO: 0.4 % — SIGNIFICANT CHANGE UP (ref 0–2)
BILIRUB DIRECT SERPL-MCNC: 0.1 MG/DL — SIGNIFICANT CHANGE UP (ref 0–0.3)
BILIRUB INDIRECT FLD-MCNC: 0.6 MG/DL — SIGNIFICANT CHANGE UP (ref 0.2–1)
BILIRUB SERPL-MCNC: 0.8 MG/DL — SIGNIFICANT CHANGE UP (ref 0.4–2)
BLD GP AB SCN SERPL QL: SIGNIFICANT CHANGE UP
BUN SERPL-MCNC: 8.3 MG/DL — SIGNIFICANT CHANGE UP (ref 8–20)
CALCIUM SERPL-MCNC: 9.5 MG/DL — SIGNIFICANT CHANGE UP (ref 8.6–10.2)
CHLORIDE SERPL-SCNC: 100 MMOL/L — SIGNIFICANT CHANGE UP (ref 98–107)
CHOLEST SERPL-MCNC: 128 MG/DL — SIGNIFICANT CHANGE UP
CO2 SERPL-SCNC: 26 MMOL/L — SIGNIFICANT CHANGE UP (ref 22–29)
CREAT SERPL-MCNC: 0.78 MG/DL — SIGNIFICANT CHANGE UP (ref 0.5–1.3)
EGFR: 108 ML/MIN/1.73M2 — SIGNIFICANT CHANGE UP
EOSINOPHIL # BLD AUTO: 0.17 K/UL — SIGNIFICANT CHANGE UP (ref 0–0.5)
EOSINOPHIL NFR BLD AUTO: 1.7 % — SIGNIFICANT CHANGE UP (ref 0–6)
ESTIMATED AVERAGE GLUCOSE: 171 MG/DL — HIGH (ref 68–114)
GLUCOSE SERPL-MCNC: 152 MG/DL — HIGH (ref 70–99)
HCT VFR BLD CALC: 42.4 % — SIGNIFICANT CHANGE UP (ref 39–50)
HDLC SERPL-MCNC: 45 MG/DL — SIGNIFICANT CHANGE UP
HGB BLD-MCNC: 14.7 G/DL — SIGNIFICANT CHANGE UP (ref 13–17)
IMM GRANULOCYTES NFR BLD AUTO: 0.3 % — SIGNIFICANT CHANGE UP (ref 0–1.5)
LIPID PNL WITH DIRECT LDL SERPL: 59 MG/DL — SIGNIFICANT CHANGE UP
LYMPHOCYTES # BLD AUTO: 3.01 K/UL — SIGNIFICANT CHANGE UP (ref 1–3.3)
LYMPHOCYTES # BLD AUTO: 30 % — SIGNIFICANT CHANGE UP (ref 13–44)
MAGNESIUM SERPL-MCNC: 2 MG/DL — SIGNIFICANT CHANGE UP (ref 1.8–2.6)
MCHC RBC-ENTMCNC: 29.2 PG — SIGNIFICANT CHANGE UP (ref 27–34)
MCHC RBC-ENTMCNC: 34.7 GM/DL — SIGNIFICANT CHANGE UP (ref 32–36)
MCV RBC AUTO: 84.1 FL — SIGNIFICANT CHANGE UP (ref 80–100)
MONOCYTES # BLD AUTO: 0.75 K/UL — SIGNIFICANT CHANGE UP (ref 0–0.9)
MONOCYTES NFR BLD AUTO: 7.5 % — SIGNIFICANT CHANGE UP (ref 2–14)
NEUTROPHILS # BLD AUTO: 6.05 K/UL — SIGNIFICANT CHANGE UP (ref 1.8–7.4)
NEUTROPHILS NFR BLD AUTO: 60.1 % — SIGNIFICANT CHANGE UP (ref 43–77)
NON HDL CHOLESTEROL: 83 MG/DL — SIGNIFICANT CHANGE UP
PLATELET # BLD AUTO: 341 K/UL — SIGNIFICANT CHANGE UP (ref 150–400)
POTASSIUM SERPL-MCNC: 3.6 MMOL/L — SIGNIFICANT CHANGE UP (ref 3.5–5.3)
POTASSIUM SERPL-SCNC: 3.6 MMOL/L — SIGNIFICANT CHANGE UP (ref 3.5–5.3)
PROT SERPL-MCNC: 8.6 G/DL — SIGNIFICANT CHANGE UP (ref 6.6–8.7)
RBC # BLD: 5.04 M/UL — SIGNIFICANT CHANGE UP (ref 4.2–5.8)
RBC # FLD: 12.1 % — SIGNIFICANT CHANGE UP (ref 10.3–14.5)
SODIUM SERPL-SCNC: 141 MMOL/L — SIGNIFICANT CHANGE UP (ref 135–145)
TRIGL SERPL-MCNC: 120 MG/DL — SIGNIFICANT CHANGE UP
WBC # BLD: 10.05 K/UL — SIGNIFICANT CHANGE UP (ref 3.8–10.5)
WBC # FLD AUTO: 10.05 K/UL — SIGNIFICANT CHANGE UP (ref 3.8–10.5)

## 2022-04-26 PROCEDURE — C1874: CPT

## 2022-04-26 PROCEDURE — C1887: CPT

## 2022-04-26 PROCEDURE — 99152 MOD SED SAME PHYS/QHP 5/>YRS: CPT

## 2022-04-26 PROCEDURE — 86901 BLOOD TYPING SEROLOGIC RH(D): CPT

## 2022-04-26 PROCEDURE — 93005 ELECTROCARDIOGRAM TRACING: CPT

## 2022-04-26 PROCEDURE — C1894: CPT

## 2022-04-26 PROCEDURE — 99153 MOD SED SAME PHYS/QHP EA: CPT

## 2022-04-26 PROCEDURE — 80061 LIPID PANEL: CPT

## 2022-04-26 PROCEDURE — 36415 COLL VENOUS BLD VENIPUNCTURE: CPT

## 2022-04-26 PROCEDURE — 82248 BILIRUBIN DIRECT: CPT

## 2022-04-26 PROCEDURE — 83735 ASSAY OF MAGNESIUM: CPT

## 2022-04-26 PROCEDURE — 86900 BLOOD TYPING SEROLOGIC ABO: CPT

## 2022-04-26 PROCEDURE — 93010 ELECTROCARDIOGRAM REPORT: CPT

## 2022-04-26 PROCEDURE — 75716 ARTERY X-RAYS ARMS/LEGS: CPT | Mod: 59

## 2022-04-26 PROCEDURE — C1725: CPT

## 2022-04-26 PROCEDURE — 80053 COMPREHEN METABOLIC PANEL: CPT

## 2022-04-26 PROCEDURE — 37226: CPT

## 2022-04-26 PROCEDURE — 83036 HEMOGLOBIN GLYCOSYLATED A1C: CPT

## 2022-04-26 PROCEDURE — C1769: CPT

## 2022-04-26 PROCEDURE — 85025 COMPLETE CBC W/AUTO DIFF WBC: CPT

## 2022-04-26 PROCEDURE — 86850 RBC ANTIBODY SCREEN: CPT

## 2022-04-26 PROCEDURE — C1760: CPT

## 2022-04-26 RX ORDER — METFORMIN HYDROCHLORIDE 850 MG/1
0 TABLET ORAL
Qty: 0 | Refills: 0 | DISCHARGE

## 2022-04-26 RX ORDER — ASPIRIN/CALCIUM CARB/MAGNESIUM 324 MG
81 TABLET ORAL ONCE
Refills: 0 | Status: COMPLETED | OUTPATIENT
Start: 2022-04-26 | End: 2022-04-26

## 2022-04-26 RX ORDER — SODIUM CHLORIDE 9 MG/ML
500 INJECTION INTRAMUSCULAR; INTRAVENOUS; SUBCUTANEOUS
Refills: 0 | Status: DISCONTINUED | OUTPATIENT
Start: 2022-04-26 | End: 2022-05-10

## 2022-04-26 RX ORDER — METFORMIN HYDROCHLORIDE 850 MG/1
1 TABLET ORAL
Qty: 0 | Refills: 0 | DISCHARGE

## 2022-04-26 RX ORDER — PRASUGREL 5 MG/1
1 TABLET, FILM COATED ORAL
Qty: 0 | Refills: 0 | DISCHARGE

## 2022-04-26 RX ORDER — LOSARTAN POTASSIUM 100 MG/1
1 TABLET, FILM COATED ORAL
Qty: 0 | Refills: 0 | DISCHARGE

## 2022-04-26 RX ORDER — AMLODIPINE BESYLATE 2.5 MG/1
1 TABLET ORAL
Qty: 0 | Refills: 0 | DISCHARGE

## 2022-04-26 RX ORDER — SEMAGLUTIDE 0.68 MG/ML
1 INJECTION, SOLUTION SUBCUTANEOUS
Qty: 0 | Refills: 0 | DISCHARGE

## 2022-04-26 RX ORDER — ATORVASTATIN CALCIUM 80 MG/1
1 TABLET, FILM COATED ORAL
Qty: 0 | Refills: 0 | DISCHARGE

## 2022-04-26 RX ORDER — ASPIRIN/CALCIUM CARB/MAGNESIUM 324 MG
1 TABLET ORAL
Qty: 0 | Refills: 0 | DISCHARGE

## 2022-04-26 RX ADMIN — Medication 81 MILLIGRAM(S): at 12:31

## 2022-04-26 RX ADMIN — SODIUM CHLORIDE 250 MILLILITER(S): 9 INJECTION INTRAMUSCULAR; INTRAVENOUS; SUBCUTANEOUS at 12:35

## 2022-04-26 NOTE — DISCHARGE NOTE PROVIDER - CARE PROVIDER_API CALL
Amado Coleman  [facility]  [address] [place] [address]  Phone: [phone]  Fax: ([phone]  Established Patient  Follow Up Time:     Cameron Mckee)  Cardiovascular Disease; Interventional Cardiology  [address] [place] [address]  Phone: [phone]  Fax: [phone]  Established Patient  Follow Up Time:

## 2022-04-26 NOTE — DISCHARGE NOTE PROVIDER - NSDCCPTREATMENT_GEN_ALL_CORE_FT
PRINCIPAL PROCEDURE  Procedure: Angiography rt extremity  Findings and Treatment: Right mid SFA balloon and stenting via LFA

## 2022-04-26 NOTE — DISCHARGE NOTE PROVIDER - NSDCMRMEDTOKEN_GEN_ALL_CORE_FT
amLODIPine 10 mg oral tablet: 1 tab(s) orally once a day  Aspir 81 oral delayed release tablet: 1 tab(s) orally once a day  atorvastatin 20 mg oral tablet: 1 tab(s) orally once a day  glipiZIDE 10 mg oral tablet: 1 tab(s) orally 2 times a day  hydroCHLOROthiazide 12.5 mg oral tablet: 1 tab(s) orally once a day  losartan 100 mg oral tablet: 1 tab(s) orally once a day  metFORMIN 1000 mg oral tablet, extended release: orally 2 times a day  Ozempic 2 mg/1.5 mL (0.25 mg or 0.5 mg dose) subcutaneous solution: 1 dose(s) subcutaneous once a week  pantoprazole 40 mg oral delayed release tablet: 1 tab(s) orally once a day (before a meal)  prasugrel 10 mg oral tablet: 1 tab(s) orally once a day

## 2022-04-26 NOTE — DISCHARGE NOTE NURSING/CASE MANAGEMENT/SOCIAL WORK - NSDCPEFALRISK_GEN_ALL_CORE
For information on Fall & Injury Prevention, visit: [email or web address] OR  [email or web address] OR  https://www.cdc.gov/steadi/patient.html

## 2022-04-26 NOTE — DISCHARGE NOTE PROVIDER - HOSPITAL COURSE
Patient is a 51y old  Male who presents with a chief complaint of PAD (25 Apr 2022 18:28)    51y  Male is now s/p RLE angiography/ Right midSFA balloon/ stenting via #6FrLFA ( now with Angioseal closure agent) by Dr Cameron Mckee  -post PAD orders  -LFA groin precautions/ education  -bedrest x  3 hours post procedure till 5PM  -post procedure fluid hydration ordered  -POCT glucose testing before meals and at bedtime   -continue current medical therapy  -Dual anti platelet therapy with aspirin/ effient-, reinforced importance of strict adherence to DAPT as prior to procedure  -Continue statin therapy  resume metformin 48 hrs post procedure on Thursday am 4/28  -follow up outpt in 1- 2 weeks with Cardiologist Dr Mckee  -Lifestyle modifications discussed to reduce cardiovascular risk factors including weight reduction, smoking cessation, medication compliance, and routine follow up with Cardiologist to track your BMI, cholesterol, and glucose levels.   -Discharge home later today after 6PM

## 2022-04-26 NOTE — ASU PATIENT PROFILE, ADULT - NSICDXPASTSURGICALHX_GEN_ALL_CORE_FT
PAST SURGICAL HISTORY:  Presence of drug coated stent in left circumflex coronary artery 3.5 12 Radha JOE pLCX, 2.5 X 38 [place] JOE mLCX    S/P appendectomy     Status post insertion of drug-eluting stent into left anterior descending (LAD) artery for coronary RADHA 4.00 X 12MM drug-eluting stent pLAD    Status post insertion of drug-eluting stent into left anterior descending (LAD) artery for coronary 3.0 X 15 [place]ha JOE pLAD, 2.5 X 38 [place] JOE mLAD    Status post insertion of drug-eluting stent into right coronary artery for coronary artery disease 4.0 X 23 Xience JOE pRCA, 3.5 X 33 Xience JOE mRCA, 3.0 X 38 Xience JOE dRCA

## 2022-04-26 NOTE — PROGRESS NOTE ADULT - SUBJECTIVE AND OBJECTIVE BOX
Department of Cardiology                                                                  [facility]/[address]-[address]                                                            Telephone: [phone]. Fax:[phone]                                                    Post- Procedure Note: RLE angiography/ Right midSFA balloon/ stenting via #6FrLFA ( now with Angioseal closure agent) by Dr Cameron Mckee      Narrative:  51y  Male is now s/p RLE angiography/ Right midSFA balloon/ stenting via #6FrLFA ( now with Angioseal closure agent) by Dr Cameron Mckee  9,000 units heparin given, Last ACT >400    PAST MEDICAL & SURGICAL HISTORY:  Coronary artery disease of native artery of native heart with stable angina pectoris    Type 2 diabetes mellitus with other circulatory complication, without long-term current use of insul    PAD (peripheral artery disease)    Essential hypertension    Pure hypercholesterolemia    S/P appendectomy    Status post insertion of drug-eluting stent into left anterior descending (LAD) artery for coronary  RADHA 4.00 X 12MM drug-eluting stent pLAD    Status post insertion of drug-eluting stent into left anterior descending (LAD) artery for coronary  3.0 X 15 Radha JOE pLAD, 2.5 X 38 [place] JOE mLAD    Presence of drug coated stent in left circumflex coronary artery  3.5 12 [place] JOE pLCX, 2.5 X 38 Radha JOE mLCX    Status post insertion of drug-eluting stent into right coronary artery for coronary artery disease  4.0 X 23 Xience JOE pRCA, 3.5 X 33 [place]ience JOE mRCA, 3.0 X 38 Xience JOE dRCA      Home Medications:  amLODIPine 10 mg oral tablet: 1 tab(s) orally once a day (26 Apr 2022 12:22)  Aspir 81 oral delayed release tablet: 1 tab(s) orally once a day (26 Apr 2022 12:22)  atorvastatin 20 mg oral tablet: 1 tab(s) orally once a day (26 Apr 2022 12:22)  glipiZIDE 10 mg oral tablet: 1 tab(s) orally 2 times a day (26 Apr 2022 12:22)  hydroCHLOROthiazide 12.5 mg oral tablet: 1 tab(s) orally once a day (26 Apr 2022 12:22)  losartan 100 mg oral tablet: 1 tab(s) orally once a day (26 Apr 2022 12:22)  metFORMIN 1000 mg oral tablet, extended release: orally 2 times a day (26 Apr 2022 12:22)  Ozempic 2 mg/1.5 mL (0.25 mg or 0.5 mg dose) subcutaneous solution: 1 dose(s) subcutaneous once a week (26 Apr 2022 12:22)  prasugrel 10 mg oral tablet: 1 tab(s) orally once a day (26 Apr 2022 12:22)        No Known Allergies      Objective:  Vital Signs Last 24 Hrs  T(C): 36.5 (26 Apr 2022 11:37), Max: 36.5 (26 Apr 2022 11:37)  T(F): 97.7 (26 Apr 2022 11:37), Max: 97.7 (26 Apr 2022 11:37)  HR: 74 (26 Apr 2022 14:05) (74 - 78)  BP: 136/79 (26 Apr 2022 14:05) (136/79 - 156/90)  BP(mean): 112 (25 Apr 2022 18:28) (112 - 112)  RR: 16 (26 Apr 2022 14:05) (16 - 20)  SpO2: 99% (26 Apr 2022 14:05) (99% - 100%)    GENERAL: Pt lying comfortably, NAD.  ENMT: PERRL, +EOMI.  NECK: soft, Supple, No JVD,   CHEST/LUNG: Clear to auscultatation bilaterally; No wheezing.  HEART: S1S2+, Regular rate and rhythm; No murmurs.  ABDOMEN: Soft, Nontender, Nondistended; Bowel sounds present.  MUSCULOSKELETAL: Normal range of motion.  SKIN: No rashes or lesions.  NEURO: AAOX3, no focal deficits, no motor r sensory loss.  PSYCH: normal mood.  Procedure site: #6FR .LFA...(angiosealed) access/ RLE assessment......, no signs of bleeding or hematoma, neurovascular intact Warm and mobile bilaterally  VASCULAR:   Femoral +2 R/+2 L  DP +1 R/+1 L                          14.7   10.05 )-----------( 341      ( 26 Apr 2022 11:47 )             42.4     04-26    141  |  100  |  8.3  ----------------------------<  152<H>  3.6   |  26.0  |  0.78    Ca    9.5      26 Apr 2022 11:47  Mg     2.0     04-26    TPro  8.6  /  Alb  4.4  /  TBili  0.8  /  DBili  0.1  /  AST  22  /  ALT  32  /  AlkPhos  87  04-26        Patient is a 51y old  Male who presents with a chief complaint of PAD (25 Apr 2022 18:28)    51y  Male is now s/p RLE angiography/ Right midSFA balloon/ stenting via #6FrLFA ( now with Angioseal closure agent) by Dr Cameron Mckee  -post PAD orders  -LFA groin precautions/ education  -bedrest x  3 hours post procedure till 5PM  -post procedure fluid hydration ordered  -POCT glucose testing before meals and at bedtime   -continue current medical therapy  -Dual anti platelet therapy with aspirin/ effient-, reinforced importance of strict adherence to DAPT as prior to procedure  -Continue statin therapy  -follow up outpt in 1- 2 weeks with Cardiologist Dr Mckee  -Lifestyle modifications discussed to reduce cardiovascular risk factors including weight reduction, smoking cessation, medication compliance, and routine follow up with Cardiologist to track your BMI, cholesterol, and glucose levels.   -Discharge home later today after 6PM

## 2022-04-26 NOTE — DISCHARGE NOTE PROVIDER - PROVIDER TOKENS
PROVIDER:[TOKEN:[[id number]],ESTABLISHEDPATIENT:[T]],PROVIDER:[TOKEN:[[id number]],ESTABLISHEDPATIENT:[T]]

## 2022-04-29 DIAGNOSIS — I70.211 ATHEROSCLEROSIS OF NATIVE ARTERIES OF EXTREMITIES WITH INTERMITTENT CLAUDICATION, RIGHT LEG: ICD-10-CM

## 2022-07-21 NOTE — ASU DISCHARGE PLAN (ADULT/PEDIATRIC) - NS MD DC FALL RISK RISK
For information on Fall & Injury Prevention, visit: [email or web address] OR  [email or web address] OR  https://www.cdc.gov/steadi/patient.html no

## 2022-12-14 ENCOUNTER — OFFICE (OUTPATIENT)
Dept: URBAN - METROPOLITAN AREA CLINIC 1 | Facility: CLINIC | Age: 52
Setting detail: OPHTHALMOLOGY
End: 2022-12-14
Payer: COMMERCIAL

## 2022-12-14 DIAGNOSIS — H43.393: ICD-10-CM

## 2022-12-14 DIAGNOSIS — H25.13: ICD-10-CM

## 2022-12-14 DIAGNOSIS — H01.001: ICD-10-CM

## 2022-12-14 DIAGNOSIS — H01.004: ICD-10-CM

## 2022-12-14 DIAGNOSIS — E11.9: ICD-10-CM

## 2022-12-14 PROCEDURE — 92014 COMPRE OPH EXAM EST PT 1/>: CPT | Performed by: OPHTHALMOLOGY

## 2022-12-14 ASSESSMENT — TONOMETRY
OD_IOP_MMHG: 16
OS_IOP_MMHG: 20

## 2022-12-14 ASSESSMENT — CONFRONTATIONAL VISUAL FIELD TEST (CVF)
OS_FINDINGS: FULL
OD_FINDINGS: FULL

## 2022-12-14 ASSESSMENT — REFRACTION_CURRENTRX
OD_AXIS: 080
OS_VPRISM_DIRECTION: SV
OS_OVR_VA: 20/
OS_AXIS: 070
OS_CYLINDER: -0.50
OD_SPHERE: -0.50
OD_VPRISM_DIRECTION: SV
OD_CYLINDER: -0.50
OS_SPHERE: -0.50
OD_OVR_VA: 20/

## 2022-12-14 ASSESSMENT — KERATOMETRY
OS_AXISANGLE_DEGREES: 030
OS_K2POWER_DIOPTERS: 41.00
OD_AXISANGLE_DEGREES: 157
OD_K1POWER_DIOPTERS: 41.00
OS_K1POWER_DIOPTERS: 40.75
OD_K2POWER_DIOPTERS: 41.25

## 2022-12-14 ASSESSMENT — LID EXAM ASSESSMENTS
OS_BLEPHARITIS: LUL
OD_BLEPHARITIS: RUL

## 2022-12-14 ASSESSMENT — REFRACTION_MANIFEST
OD_SPHERE: +0.25
OD_ADD: +2.00
OS_CYLINDER: -0.75
OS_VA1: 20/20
OD_AXIS: 083
OS_SPHERE: PLANO
OD_VA1: 20/20
OS_AXIS: 085
OS_ADD: +2.00
OD_CYLINDER: -1.00

## 2022-12-14 ASSESSMENT — REFRACTION_AUTOREFRACTION
OS_CYLINDER: -0.75
OD_AXIS: 083
OD_CYLINDER: -1.00
OS_AXIS: 085
OD_SPHERE: +0.25
OS_SPHERE: PLANO

## 2022-12-14 ASSESSMENT — SPHEQUIV_DERIVED
OD_SPHEQUIV: -0.25
OD_SPHEQUIV: -0.25

## 2022-12-14 ASSESSMENT — VISUAL ACUITY
OD_BCVA: 20/20
OS_BCVA: 20/20

## 2022-12-14 ASSESSMENT — AXIALLENGTH_DERIVED
OD_AL: 24.6032
OD_AL: 24.6032

## 2023-12-18 ENCOUNTER — OFFICE (OUTPATIENT)
Dept: URBAN - METROPOLITAN AREA CLINIC 1 | Facility: CLINIC | Age: 53
Setting detail: OPHTHALMOLOGY
End: 2023-12-18
Payer: COMMERCIAL

## 2023-12-18 ENCOUNTER — RX ONLY (RX ONLY)
Age: 53
End: 2023-12-18

## 2023-12-18 DIAGNOSIS — H01.001: ICD-10-CM

## 2023-12-18 DIAGNOSIS — E11.9: ICD-10-CM

## 2023-12-18 DIAGNOSIS — H43.393: ICD-10-CM

## 2023-12-18 DIAGNOSIS — H25.13: ICD-10-CM

## 2023-12-18 DIAGNOSIS — H01.004: ICD-10-CM

## 2023-12-18 PROCEDURE — 92014 COMPRE OPH EXAM EST PT 1/>: CPT | Performed by: OPHTHALMOLOGY

## 2023-12-18 ASSESSMENT — REFRACTION_CURRENTRX
OS_OVR_VA: 20/
OD_ADD: +2.00
OS_CYLINDER: -0.75
OS_CYLINDER: -0.50
OD_VPRISM_DIRECTION: SV
OS_VPRISM_DIRECTION: SV
OD_VPRISM_DIRECTION: PROGS
OS_SPHERE: -0.50
OS_AXIS: 080
OD_OVR_VA: 20/
OD_SPHERE: -0.50
OD_OVR_VA: 20/
OS_SPHERE: PLANO
OD_CYLINDER: -0.50
OS_AXIS: 070
OD_AXIS: 093
OS_ADD: +2.00
OD_SPHERE: PLANO
OS_OVR_VA: 20/
OS_VPRISM_DIRECTION: PROGS
OD_AXIS: 080
OD_CYLINDER: -0.75

## 2023-12-18 ASSESSMENT — LID EXAM ASSESSMENTS
OS_BLEPHARITIS: LUL
OD_BLEPHARITIS: RUL

## 2023-12-18 ASSESSMENT — REFRACTION_MANIFEST
OD_AXIS: 085
OS_VA1: 20/20
OD_SPHERE: +0.25
OS_SPHERE: PLANO
OD_CYLINDER: -0.75
OS_CYLINDER: -0.75
OS_AXIS: 090
OD_VA1: 20/20-2
OU_VA: 20/20
OD_ADD: +2.00
OS_ADD: +2.00

## 2023-12-18 ASSESSMENT — REFRACTION_AUTOREFRACTION
OS_SPHERE: PLANO
OD_SPHERE: +0.25
OS_AXIS: 089
OS_CYLINDER: -0.75
OD_CYLINDER: -0.75
OD_AXIS: 087

## 2023-12-18 ASSESSMENT — SPHEQUIV_DERIVED
OD_SPHEQUIV: -0.125
OD_SPHEQUIV: -0.125

## 2023-12-18 ASSESSMENT — CONFRONTATIONAL VISUAL FIELD TEST (CVF)
OS_FINDINGS: FULL
OD_FINDINGS: FULL

## 2024-12-21 ENCOUNTER — OFFICE (OUTPATIENT)
Dept: URBAN - METROPOLITAN AREA CLINIC 1 | Facility: CLINIC | Age: 54
Setting detail: OPHTHALMOLOGY
End: 2024-12-21
Payer: COMMERCIAL

## 2024-12-21 DIAGNOSIS — H52.4: ICD-10-CM

## 2024-12-21 DIAGNOSIS — H01.001: ICD-10-CM

## 2024-12-21 DIAGNOSIS — H43.393: ICD-10-CM

## 2024-12-21 DIAGNOSIS — E11.9: ICD-10-CM

## 2024-12-21 DIAGNOSIS — H01.004: ICD-10-CM

## 2024-12-21 DIAGNOSIS — H25.13: ICD-10-CM

## 2024-12-21 PROCEDURE — 92015 DETERMINE REFRACTIVE STATE: CPT | Performed by: OPHTHALMOLOGY

## 2024-12-21 PROCEDURE — 92014 COMPRE OPH EXAM EST PT 1/>: CPT | Performed by: OPHTHALMOLOGY

## 2024-12-21 ASSESSMENT — REFRACTION_MANIFEST
OD_CYLINDER: -0.50
OS_SPHERE: -0.50
OS_ADD: +2.25
OD_AXIS: 090
OU_VA: 20/20
OS_VA1: 20/20
OS_AXIS: 090
OS_CYLINDER: -0.50
OD_ADD: +2.25
OD_VA1: 20/20
OD_SPHERE: -0.50

## 2024-12-21 ASSESSMENT — REFRACTION_AUTOREFRACTION
OD_AXIS: 091
OS_AXIS: 089
OD_CYLINDER: -0.50
OD_SPHERE: -0.50
OS_CYLINDER: -0.50
OS_SPHERE: -0.50

## 2024-12-21 ASSESSMENT — REFRACTION_CURRENTRX
OS_OVR_VA: 20/
OD_OVR_VA: 20/
OD_SPHERE: PLANO
OD_ADD: +2.00
OS_OVR_VA: 20/
OS_CYLINDER: -0.75
OD_SPHERE: -0.50
OD_VPRISM_DIRECTION: SV
OS_ADD: +2.00
OS_AXIS: 080
OS_AXIS: 083
OD_CYLINDER: -0.75
OD_AXIS: 097
OS_SPHERE: PLANO
OS_VPRISM_DIRECTION: PROGS
OD_CYLINDER: -0.50
OD_AXIS: 093
OD_OVR_VA: 20/
OD_VPRISM_DIRECTION: PROGS
OS_VPRISM_DIRECTION: SV
OS_CYLINDER: -0.50
OS_SPHERE: -0.50

## 2024-12-21 ASSESSMENT — KERATOMETRY
OD_AXISANGLE_DEGREES: 119
OS_K1POWER_DIOPTERS: 40.75
OD_K1POWER_DIOPTERS: 41.25
OS_AXISANGLE_DEGREES: 035
OD_K2POWER_DIOPTERS: 41.50
OS_K2POWER_DIOPTERS: 41.00

## 2024-12-21 ASSESSMENT — LID EXAM ASSESSMENTS
OD_BLEPHARITIS: RUL
OS_BLEPHARITIS: LUL

## 2024-12-21 ASSESSMENT — TONOMETRY
OD_IOP_MMHG: 15
OS_IOP_MMHG: 16

## 2024-12-21 ASSESSMENT — VISUAL ACUITY
OS_BCVA: 20/20
OD_BCVA: 20/20

## 2024-12-21 ASSESSMENT — CONFRONTATIONAL VISUAL FIELD TEST (CVF)
OS_FINDINGS: FULL
OD_FINDINGS: FULL

## 2025-04-21 NOTE — H&P PST ADULT - VASCULAR DETAILS
Outgoing call placed to patient  to complete post discharge assessment and transitional care management:   - Follow up for Hospital/ ED visit - discharged on 4/20/25.     No answer. Unable to leave message due to full mailbox.     
+2 palpable pulses x4 extremities